# Patient Record
Sex: MALE | Race: BLACK OR AFRICAN AMERICAN | NOT HISPANIC OR LATINO | Employment: PART TIME | URBAN - METROPOLITAN AREA
[De-identification: names, ages, dates, MRNs, and addresses within clinical notes are randomized per-mention and may not be internally consistent; named-entity substitution may affect disease eponyms.]

---

## 2019-03-11 ENCOUNTER — HOSPITAL ENCOUNTER (EMERGENCY)
Facility: OTHER | Age: 49
Discharge: HOME | End: 2019-03-11
Attending: EMERGENCY MEDICINE
Payer: MEDICAID

## 2019-03-11 VITALS
HEIGHT: 71 IN | SYSTOLIC BLOOD PRESSURE: 129 MMHG | TEMPERATURE: 99 F | RESPIRATION RATE: 16 BRPM | DIASTOLIC BLOOD PRESSURE: 83 MMHG | WEIGHT: 185 LBS | HEART RATE: 102 BPM | BODY MASS INDEX: 25.9 KG/M2 | OXYGEN SATURATION: 97 %

## 2019-03-11 DIAGNOSIS — S02.601A CLOSED FRACTURE OF RIGHT SIDE OF MANDIBULAR BODY, INITIAL ENCOUNTER: Primary | ICD-10-CM

## 2019-03-11 PROCEDURE — 99284 EMERGENCY DEPT VISIT MOD MDM: CPT
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
## 2022-02-27 ENCOUNTER — HOSPITAL ENCOUNTER (EMERGENCY)
Facility: HOSPITAL | Age: 52
Discharge: HOME | End: 2022-02-27
Attending: EMERGENCY MEDICINE
Payer: MEDICAID

## 2022-02-27 VITALS
TEMPERATURE: 98 F | SYSTOLIC BLOOD PRESSURE: 147 MMHG | DIASTOLIC BLOOD PRESSURE: 60 MMHG | OXYGEN SATURATION: 96 % | RESPIRATION RATE: 18 BRPM | HEART RATE: 79 BPM

## 2022-02-27 DIAGNOSIS — Z78.9 ALCOHOL USE: Primary | ICD-10-CM

## 2022-02-27 DIAGNOSIS — E83.42 HYPOMAGNESEMIA: ICD-10-CM

## 2022-02-27 DIAGNOSIS — M79.604 PAIN IN BOTH LOWER EXTREMITIES: ICD-10-CM

## 2022-02-27 DIAGNOSIS — E87.29 HIGH ANION GAP METABOLIC ACIDOSIS: ICD-10-CM

## 2022-02-27 DIAGNOSIS — M79.605 PAIN IN BOTH LOWER EXTREMITIES: ICD-10-CM

## 2022-02-27 DIAGNOSIS — R16.0 HEPATOMEGALY: ICD-10-CM

## 2022-02-27 LAB
ALBUMIN SERPL BCP-MCNC: 4.1 G/DL (ref 3.5–5.2)
ALP SERPL-CCNC: 97 U/L (ref 55–135)
ALT SERPL W/O P-5'-P-CCNC: 73 U/L (ref 10–44)
AMPHET+METHAMPHET UR QL: NEGATIVE
ANION GAP SERPL CALC-SCNC: 18 MMOL/L (ref 8–16)
ANION GAP SERPL CALC-SCNC: 20 MMOL/L (ref 8–16)
AST SERPL-CCNC: 247 U/L (ref 10–40)
BACTERIA #/AREA URNS AUTO: ABNORMAL /HPF
BARBITURATES UR QL SCN>200 NG/ML: NEGATIVE
BASOPHILS # BLD AUTO: 0.07 K/UL (ref 0–0.2)
BASOPHILS NFR BLD: 1.1 % (ref 0–1.9)
BENZODIAZ UR QL SCN>200 NG/ML: NEGATIVE
BILIRUB SERPL-MCNC: 1.1 MG/DL (ref 0.1–1)
BILIRUB UR QL STRIP: NEGATIVE
BUN SERPL-MCNC: 19 MG/DL (ref 6–20)
BUN SERPL-MCNC: 19 MG/DL (ref 6–20)
BUN SERPL-MCNC: 22 MG/DL (ref 6–30)
BZE UR QL SCN: NEGATIVE
CALCIUM SERPL-MCNC: 9 MG/DL (ref 8.7–10.5)
CALCIUM SERPL-MCNC: 9.5 MG/DL (ref 8.7–10.5)
CANNABINOIDS UR QL SCN: NEGATIVE
CHLORIDE SERPL-SCNC: 100 MMOL/L (ref 95–110)
CHLORIDE SERPL-SCNC: 97 MMOL/L (ref 95–110)
CHLORIDE SERPL-SCNC: 98 MMOL/L (ref 95–110)
CK SERPL-CCNC: 354 U/L (ref 20–200)
CLARITY UR REFRACT.AUTO: CLEAR
CO2 SERPL-SCNC: 22 MMOL/L (ref 23–29)
CO2 SERPL-SCNC: 23 MMOL/L (ref 23–29)
COLOR UR AUTO: YELLOW
CREAT SERPL-MCNC: 1.4 MG/DL (ref 0.5–1.4)
CREAT SERPL-MCNC: 1.7 MG/DL (ref 0.5–1.4)
CREAT SERPL-MCNC: 2.2 MG/DL (ref 0.5–1.4)
CREAT UR-MCNC: 65 MG/DL (ref 23–375)
DIFFERENTIAL METHOD: ABNORMAL
EOSINOPHIL # BLD AUTO: 0 K/UL (ref 0–0.5)
EOSINOPHIL NFR BLD: 0.2 % (ref 0–8)
ERYTHROCYTE [DISTWIDTH] IN BLOOD BY AUTOMATED COUNT: 17.7 % (ref 11.5–14.5)
EST. GFR  (AFRICAN AMERICAN): 51.7 ML/MIN/1.73 M^2
EST. GFR  (AFRICAN AMERICAN): >60 ML/MIN/1.73 M^2
EST. GFR  (NON AFRICAN AMERICAN): 44.7 ML/MIN/1.73 M^2
EST. GFR  (NON AFRICAN AMERICAN): 56.6 ML/MIN/1.73 M^2
ETHANOL SERPL-MCNC: 332 MG/DL
GLUCOSE SERPL-MCNC: 73 MG/DL (ref 70–110)
GLUCOSE SERPL-MCNC: 84 MG/DL (ref 70–110)
GLUCOSE SERPL-MCNC: 88 MG/DL (ref 70–110)
GLUCOSE UR QL STRIP: NEGATIVE
HCT VFR BLD AUTO: 29.2 % (ref 40–54)
HCT VFR BLD CALC: 33 %PCV (ref 36–54)
HGB BLD-MCNC: 10 G/DL (ref 14–18)
HGB UR QL STRIP: ABNORMAL
HYALINE CASTS UR QL AUTO: 37 /LPF
IMM GRANULOCYTES # BLD AUTO: 0.02 K/UL (ref 0–0.04)
IMM GRANULOCYTES NFR BLD AUTO: 0.3 % (ref 0–0.5)
KETONES UR QL STRIP: NEGATIVE
LEUKOCYTE ESTERASE UR QL STRIP: NEGATIVE
LIPASE SERPL-CCNC: 44 U/L (ref 4–60)
LYMPHOCYTES # BLD AUTO: 1.6 K/UL (ref 1–4.8)
LYMPHOCYTES NFR BLD: 24.6 % (ref 18–48)
MAGNESIUM SERPL-MCNC: 1.2 MG/DL (ref 1.6–2.6)
MCH RBC QN AUTO: 36.9 PG (ref 27–31)
MCHC RBC AUTO-ENTMCNC: 34.2 G/DL (ref 32–36)
MCV RBC AUTO: 108 FL (ref 82–98)
METHADONE UR QL SCN>300 NG/ML: NEGATIVE
MICROSCOPIC COMMENT: ABNORMAL
MONOCYTES # BLD AUTO: 0.4 K/UL (ref 0.3–1)
MONOCYTES NFR BLD: 6.6 % (ref 4–15)
NEUTROPHILS # BLD AUTO: 4.3 K/UL (ref 1.8–7.7)
NEUTROPHILS NFR BLD: 67.2 % (ref 38–73)
NITRITE UR QL STRIP: NEGATIVE
NRBC BLD-RTO: 0 /100 WBC
OPIATES UR QL SCN: NEGATIVE
PCP UR QL SCN>25 NG/ML: NEGATIVE
PH UR STRIP: 5 [PH] (ref 5–8)
PLATELET # BLD AUTO: 320 K/UL (ref 150–450)
PMV BLD AUTO: 9.3 FL (ref 9.2–12.9)
POC IONIZED CALCIUM: 0.99 MMOL/L (ref 1.06–1.42)
POC TCO2 (MEASURED): 24 MMOL/L (ref 23–29)
POTASSIUM BLD-SCNC: 4.4 MMOL/L (ref 3.5–5.1)
POTASSIUM SERPL-SCNC: 4.5 MMOL/L (ref 3.5–5.1)
POTASSIUM SERPL-SCNC: 4.8 MMOL/L (ref 3.5–5.1)
PROT SERPL-MCNC: 9.6 G/DL (ref 6–8.4)
PROT UR QL STRIP: ABNORMAL
RBC # BLD AUTO: 2.71 M/UL (ref 4.6–6.2)
RBC #/AREA URNS AUTO: 1 /HPF (ref 0–4)
SAMPLE: ABNORMAL
SODIUM BLD-SCNC: 139 MMOL/L (ref 136–145)
SODIUM SERPL-SCNC: 138 MMOL/L (ref 136–145)
SODIUM SERPL-SCNC: 140 MMOL/L (ref 136–145)
SP GR UR STRIP: 1.02 (ref 1–1.03)
SQUAMOUS #/AREA URNS AUTO: 0 /HPF
TOXICOLOGY INFORMATION: NORMAL
URN SPEC COLLECT METH UR: ABNORMAL
WBC # BLD AUTO: 6.33 K/UL (ref 3.9–12.7)
WBC #/AREA URNS AUTO: 1 /HPF (ref 0–5)

## 2022-02-27 PROCEDURE — 82550 ASSAY OF CK (CPK): CPT | Performed by: PHYSICIAN ASSISTANT

## 2022-02-27 PROCEDURE — 83735 ASSAY OF MAGNESIUM: CPT | Performed by: PHYSICIAN ASSISTANT

## 2022-02-27 PROCEDURE — 82077 ASSAY SPEC XCP UR&BREATH IA: CPT | Performed by: PHYSICIAN ASSISTANT

## 2022-02-27 PROCEDURE — 96366 THER/PROPH/DIAG IV INF ADDON: CPT

## 2022-02-27 PROCEDURE — 80307 DRUG TEST PRSMV CHEM ANLYZR: CPT | Performed by: PHYSICIAN ASSISTANT

## 2022-02-27 PROCEDURE — 83690 ASSAY OF LIPASE: CPT | Performed by: PHYSICIAN ASSISTANT

## 2022-02-27 PROCEDURE — 80053 COMPREHEN METABOLIC PANEL: CPT | Performed by: PHYSICIAN ASSISTANT

## 2022-02-27 PROCEDURE — 85025 COMPLETE CBC W/AUTO DIFF WBC: CPT | Performed by: PHYSICIAN ASSISTANT

## 2022-02-27 PROCEDURE — 99284 PR EMERGENCY DEPT VISIT,LEVEL IV: ICD-10-PCS | Mod: ,,, | Performed by: EMERGENCY MEDICINE

## 2022-02-27 PROCEDURE — 99284 EMERGENCY DEPT VISIT MOD MDM: CPT | Mod: ,,, | Performed by: EMERGENCY MEDICINE

## 2022-02-27 PROCEDURE — 99285 EMERGENCY DEPT VISIT HI MDM: CPT | Mod: 25

## 2022-02-27 PROCEDURE — 25000003 PHARM REV CODE 250: Performed by: PHYSICIAN ASSISTANT

## 2022-02-27 PROCEDURE — 80048 BASIC METABOLIC PNL TOTAL CA: CPT | Performed by: PHYSICIAN ASSISTANT

## 2022-02-27 PROCEDURE — 80047 BASIC METABLC PNL IONIZED CA: CPT

## 2022-02-27 PROCEDURE — 63600175 PHARM REV CODE 636 W HCPCS: Performed by: PHYSICIAN ASSISTANT

## 2022-02-27 PROCEDURE — 81001 URINALYSIS AUTO W/SCOPE: CPT | Mod: 59 | Performed by: PHYSICIAN ASSISTANT

## 2022-02-27 PROCEDURE — 25500020 PHARM REV CODE 255: Performed by: EMERGENCY MEDICINE

## 2022-02-27 PROCEDURE — 96365 THER/PROPH/DIAG IV INF INIT: CPT

## 2022-02-27 PROCEDURE — 96361 HYDRATE IV INFUSION ADD-ON: CPT

## 2022-02-27 RX ORDER — LANOLIN ALCOHOL/MO/W.PET/CERES
100 CREAM (GRAM) TOPICAL DAILY
Qty: 30 TABLET | Refills: 0 | Status: SHIPPED | OUTPATIENT
Start: 2022-02-27

## 2022-02-27 RX ORDER — THIAMINE HCL 100 MG
100 TABLET ORAL DAILY
Status: DISCONTINUED | OUTPATIENT
Start: 2022-02-27 | End: 2022-02-27 | Stop reason: HOSPADM

## 2022-02-27 RX ORDER — GABAPENTIN 300 MG/1
300 CAPSULE ORAL 3 TIMES DAILY
Qty: 20 CAPSULE | Refills: 0 | Status: SHIPPED | OUTPATIENT
Start: 2022-02-27

## 2022-02-27 RX ORDER — GABAPENTIN 300 MG/1
300 CAPSULE ORAL
Status: COMPLETED | OUTPATIENT
Start: 2022-02-27 | End: 2022-02-27

## 2022-02-27 RX ORDER — MAGNESIUM SULFATE HEPTAHYDRATE 40 MG/ML
2 INJECTION, SOLUTION INTRAVENOUS
Status: COMPLETED | OUTPATIENT
Start: 2022-02-27 | End: 2022-02-27

## 2022-02-27 RX ORDER — DICYCLOMINE HYDROCHLORIDE 10 MG/1
20 CAPSULE ORAL
Status: COMPLETED | OUTPATIENT
Start: 2022-02-27 | End: 2022-02-27

## 2022-02-27 RX ORDER — HYDROCODONE BITARTRATE AND ACETAMINOPHEN 5; 325 MG/1; MG/1
1 TABLET ORAL
Status: COMPLETED | OUTPATIENT
Start: 2022-02-27 | End: 2022-02-27

## 2022-02-27 RX ADMIN — MAGNESIUM SULFATE IN WATER 2 G: 40 INJECTION, SOLUTION INTRAVENOUS at 03:02

## 2022-02-27 RX ADMIN — SODIUM CHLORIDE 1000 ML: 0.9 INJECTION, SOLUTION INTRAVENOUS at 01:02

## 2022-02-27 RX ADMIN — IOHEXOL 75 ML: 350 INJECTION, SOLUTION INTRAVENOUS at 01:02

## 2022-02-27 RX ADMIN — GABAPENTIN 300 MG: 300 CAPSULE ORAL at 01:02

## 2022-02-27 RX ADMIN — HYDROCODONE BITARTRATE AND ACETAMINOPHEN 1 TABLET: 5; 325 TABLET ORAL at 03:02

## 2022-02-27 RX ADMIN — DICYCLOMINE HYDROCHLORIDE 20 MG: 10 CAPSULE ORAL at 01:02

## 2022-02-27 RX ADMIN — Medication 100 MG: at 01:02

## 2022-02-27 NOTE — ED NOTES
I-STAT Chem-8+ Results:   Value Reference Range   Sodium 139 136-145 mmol/L   Potassium  4.4 3.5-5.1 mmol/L   Chloride 100  mmol/L   Ionized Calcium 0.99 1.06-1.42 mmol/L   CO2 (measured) 24 23-29 mmol/L   Glucose 88  mg/dL   BUN 22 6-30 mg/dL   Creatinine 2.2 0.5-1.4 mg/dL   Hematocrit 33 36-54%

## 2022-02-27 NOTE — PROVIDER PROGRESS NOTES - EMERGENCY DEPT.
Encounter Date: 2/27/2022    ED Physician Progress Notes           Patient signed out to me by my colleague with instructions to follow-up pending work-up. Please see main ED note for previous ED stay documentation.    Laboratory work showed elevated anion gap, elevated LFTs, hypomagnesia, elevated Crt 1.7, ETOH 332. CT abd/pelv showed no acute abnormalities noting hepatomegaly. Urinary bladder mild thickening, but UA without evidence of UTI. UA with +2 hematuria, CPK obtained and 334 and do not suspect rhabdomyolysis. Lipase negative. UDS negative. Repeat BMP obtained prior to IVF completion that showed improving AG and Crt improvement to 1.4. Patient able to tolerate PO. He is clinically sober on reassessment and ambulates without difficulty. Suspect overall presentation related to patient's chronic ETOH use and Thiamine deficiency. Advised outpatient follow-up with PCP for further management. RX for Thiamine and Gabapentin provided. ETOH use education provided. Patient expresses understanding and agreeable to the plan. Return to ED precautions given for new, worsening, or concerning symptoms.    ED Diagnosis:  Final diagnoses:  [M79.604, M79.605] Pain in both lower extremities  [Z72.89] Alcohol use (Primary)  [E83.42] Hypomagnesemia  [E87.2] High anion gap metabolic acidosis  [R16.0] Hepatomegaly    ED Disposition:   ED Disposition Condition    Discharge Stable        ED Medications:  Medications   sodium chloride 0.9% bolus 1,000 mL (0 mLs Intravenous Stopped 2/27/22 1432)   gabapentin capsule 300 mg (300 mg Oral Given 2/27/22 1331)   dicyclomine capsule 20 mg (20 mg Oral Given 2/27/22 1330)   iohexoL (OMNIPAQUE 350) injection 75 mL (75 mLs Intravenous Given 2/27/22 1338)   magnesium sulfate 2g in water 50mL IVPB (premix) (0 g Intravenous Stopped 2/27/22 1737)   HYDROcodone-acetaminophen 5-325 mg per tablet 1 tablet (1 tablet Oral Given 2/27/22 1540)   lactated ringers bolus 1,000 mL (0 mLs Intravenous Stopped  2/27/22 2485)

## 2022-02-27 NOTE — ED PROVIDER NOTES
"Encounter Date: 2/27/2022       History     Chief Complaint   Patient presents with    Abdominal Pain     LLQ abd pain. And bilateral lower extremity pain x 3 days     52-year-old male with reported history of heavy alcohol use, gout presents to the ED with complaints of pain.  Patient reports 3-4 days of constant, aching lower abdominal pain.  Denies fever, chills, nausea, vomiting, diarrhea.  Patient also complaining of pain from his feet that radiates up to his knees and thighs over the same duration.  Patient seen at an outside clinic where he reportedly had blood work stating that the results showed that it was his liver.  Patient states that he was told not to take Tylenol, ibuprofen for pain.  he was given a prescription medication for pain, but cannot recall the name and states that he ran out.     Patient is a very difficult historian not answering questions directly and repeatedly raising his voice stating "fix me!"  And "I'm in pain!" Security called to bedside.          Review of patient's allergies indicates:  No Known Allergies  Past Medical History:   Diagnosis Date    Gout attack      No past surgical history on file.  No family history on file.  Social History     Tobacco Use    Smoking status: Never Smoker   Substance Use Topics    Alcohol use: Yes     Review of Systems   Constitutional: Negative for fever.   HENT: Negative for sore throat.    Respiratory: Negative for shortness of breath.    Cardiovascular: Negative for chest pain.   Gastrointestinal: Positive for abdominal pain. Negative for nausea and vomiting.   Genitourinary: Negative for dysuria.   Musculoskeletal: Negative for back pain.   Skin: Negative for rash.   Neurological: Negative for weakness.   Hematological: Does not bruise/bleed easily.       Physical Exam     Initial Vitals [02/27/22 1136]   BP Pulse Resp Temp SpO2   (!) 175/111 (!) 111 20 97.9 °F (36.6 °C) 98 %      MAP       --         Physical Exam    Nursing note and " vitals reviewed.  Constitutional: He appears well-developed and well-nourished.  Non-toxic appearance. He does not appear ill. No distress.   HENT:   Head: Normocephalic and atraumatic.   Neck: Neck supple.   Normal range of motion.  Cardiovascular: Regular rhythm. Tachycardia present.  Exam reveals no gallop, no distant heart sounds and no friction rub.    No murmur heard.  Pulmonary/Chest: Effort normal and breath sounds normal. No accessory muscle usage. No tachypnea. No respiratory distress. He has no decreased breath sounds. He has no wheezes. He has no rhonchi. He has no rales.   Abdominal: Abdomen is soft. He exhibits distension. There is hepatomegaly. There is no abdominal tenderness. There is no guarding.   Musculoskeletal:      Cervical back: Normal range of motion and neck supple.      Comments: DP pulses are 2+ bilaterally.  Normal sensation.  Brisk capillary refill.  No swelling to the feet or lower legs.  No erythema or other skin color change.  No tenderness to palpation.     Neurological: He is alert.   Skin: No rash noted.   Psychiatric: He is agitated.   Pt initially raising voice frequently. Agitated. No slurred speech. Does not appear intoxicated.          ED Course   Procedures  Labs Reviewed   CBC W/ AUTO DIFFERENTIAL - Abnormal; Notable for the following components:       Result Value    RBC 2.71 (*)     Hemoglobin 10.0 (*)     Hematocrit 29.2 (*)      (*)     MCH 36.9 (*)     RDW 17.7 (*)     All other components within normal limits   COMPREHENSIVE METABOLIC PANEL - Abnormal; Notable for the following components:    Creatinine 1.7 (*)     Total Protein 9.6 (*)     Total Bilirubin 1.1 (*)      (*)     ALT 73 (*)     Anion Gap 20 (*)     eGFR if  51.7 (*)     eGFR if non  44.7 (*)     All other components within normal limits   MAGNESIUM - Abnormal; Notable for the following components:    Magnesium 1.2 (*)     All other components within normal  limits   ALCOHOL,MEDICAL (ETHANOL) - Abnormal; Notable for the following components:    Alcohol, Serum 332 (*)     All other components within normal limits   URINALYSIS, REFLEX TO URINE CULTURE - Abnormal; Notable for the following components:    Protein, UA 2+ (*)     Occult Blood UA 2+ (*)     All other components within normal limits    Narrative:     Specimen Source->Urine   URINALYSIS MICROSCOPIC - Abnormal; Notable for the following components:    Hyaline Casts, UA 37 (*)     All other components within normal limits    Narrative:     Specimen Source->Urine   BASIC METABOLIC PANEL - Abnormal; Notable for the following components:    CO2 22 (*)     Anion Gap 18 (*)     eGFR if non  56.6 (*)     All other components within normal limits    Narrative:     add on cpk per Ángel MCKNIGHT-KERRY order# [id number]  02/27/2022 @   17:43    CK - Abnormal; Notable for the following components:     (*)     All other components within normal limits    Narrative:     add on cpk per Ángel MCKNIGHT-KERRY order# [id number]  02/27/2022 @   17:43    ISTAT PROCEDURE - Abnormal; Notable for the following components:    POC Creatinine 2.2 (*)     POC Ionized Calcium 0.99 (*)     POC Hematocrit 33 (*)     All other components within normal limits   DRUG SCREEN PANEL, URINE EMERGENCY    Narrative:     Specimen Source->Urine   CK   LIPASE   LIPASE    Narrative:     ADD ON LIPASE PER JUAN LUIS MEDRANO/ORDER# [id number] @ 19:45 2/27/2022    add on cpk per Ángel MCKNIGHT-C order# [id number]  02/27/2022 @   17:43           Imaging Results          CT Abdomen Pelvis With Contrast (Final result)  Result time 02/27/22 14:10:05    Final result by Lizett Nix MD (02/27/22 14:10:05)                 Impression:      No acute abnormality.    Hepatomegaly and hepatic steatosis.    Circumferential mild bladder wall thickening, could be from nondistention or infectious/inflammatory process.  Correlation with urinalysis is  recommended.    Additional findings above.    Electronically signed by resident: Carmen James  Date:    02/27/2022  Time:    13:46    Electronically signed by: Lizett Nix MD  Date:    02/27/2022  Time:    14:10             Narrative:    EXAMINATION:  CT ABDOMEN PELVIS WITH CONTRAST    CLINICAL HISTORY:  Abdominal abscess/infection suspected;    TECHNIQUE:  Axial images of the abdomen and pelvis were acquired  after the use of 75 cc [id number] IV contrast.  Coronal and sagittal reconstructions were also obtained    COMPARISON:  None    FINDINGS:  Heart: Normal in size. No pericardial effusion.    Lungs: Visualized portions of the lungs are clear.    Liver: Enlarged measuring 19.4 cm.  Diffuse hepatic hypoattenuation suggestive of steatosis.  No focal hepatic lesion.  No ascites.    Gallbladder: No radiopaque stones seen.    Bile Ducts: No evidence of dilated ducts.    Pancreas: No mass or peripancreatic fat stranding.    Spleen: Unremarkable.    Stomach and duodenum: Unremarkable.    Adrenals: Unremarkable.    Kidneys/ Ureters: Normal size with abnormally rotated right kidney.  Normal enhancement. No hydronephrosis or nephrolithiasis. No ureteral dilatation.    Bladder: Circumferential wall thickening, could be from nondistention or infection/inflammation.    Reproductive organs: Unremarkable.    Bowel/Mesentery: Small bowel is normal in caliber with no evidence of obstruction. No evidence of inflammation or wall thickening.  Colon demonstrates no focal wall thickening.  Appendix is unremarkable.    Peritoneum: No intraperitoneal free air or fluid    Lymph nodes: No retroperitoneal lymphadenopathy.    Vasculature: No aneurysm. No significant calcific atherosclerosis.    Abdominal wall:  Unremarkable.    Bones: Minimal anterolisthesis of L4 on L5.  No acute fracture. No suspicious osseous lesions.                                 Medications   sodium chloride 0.9% bolus 1,000 mL (0 mLs Intravenous Stopped  2/27/22 1432)   gabapentin capsule 300 mg (300 mg Oral Given 2/27/22 1331)   dicyclomine capsule 20 mg (20 mg Oral Given 2/27/22 1330)   iohexoL (OMNIPAQUE 350) injection 75 mL (75 mLs Intravenous Given 2/27/22 1338)   magnesium sulfate 2g in water 50mL IVPB (premix) (0 g Intravenous Stopped 2/27/22 1737)   HYDROcodone-acetaminophen 5-325 mg per tablet 1 tablet (1 tablet Oral Given 2/27/22 1540)   lactated ringers bolus 1,000 mL (0 mLs Intravenous Stopped 2/27/22 1815)     Medical Decision Making:   History:   Old Medical Records: I decided to obtain old medical records.  Initial Assessment:   51 yo M with chronic EtOH use presents with abdominal and LE pain for the past 3-4 days.  Tachycardic on initial evaluation.  Vitals otherwise within normal limits.  Afebrile.  See additional exam findings above.  Differential Diagnosis:   My differential diagnosis includes but is not limited to:  Liver failure, PUD, UTI, bowel obstruction, chronic alcohol abuse, constipation, neuropathy, vascular insufficiency, vitamin insufficiency.    Clinical Tests:   Lab Tests: Ordered  Radiological Study: Ordered  ED Management:  Patient received IV fluids, magnesium and thiamine supplementation.  Liver enzymes are elevated.  Anion gap is elevated to 20. CT a/P showed bladder wall thickening without other acute abdominal pelvic pathology.  UA without infection.  Unclear etiology of patient's abdominal pain.  Suspect neuropathic pain as etiology of patient's lower extremity pain.  Will plan to treat with gabapentin, thiamine supplementation.  He is established with a PCP.   Pt signed out to PA colleague pending repeat BMP, administration of supplements and additional fluids; and final dispo.     I have reviewed the patient's records and discussed this case with my supervising physician.                Attending Attestation:     Physician Attestation Statement for NP/PA:   I have conducted a face to face encounter with this patient in  addition to the NP/PA, due to Medical Complexity    Other NP/PA Attestation Additions:    History of Present Illness: This is a 52-year-old male who presents to the emergency department today stating that he has significant pain in his legs and in his lower abdomen.  He states that he has had this intermittently for quite some time but that is worse today.  He also states that he has been drinking and does initially appear intoxicated, raising his voice frequently on his phone and demanding help.  He states that the pain in his legs does not worsen when he exerts himself.  He states that actually feels better once he is up on his feet for a while.  He has not had any vomiting or diarrhea.  He also states that he has already been seen for this previously and was told that he has some abnormalities to his liver.  He has not had any chest pain or shortness of breath.  He has not had any fevers or chills.  He has not had any back pain.   Physical Exam: Initial vital signs include a blood pressure of 175/111 and a heart rate of 111.  He was not hypoxic or febrile.  Consititutional: Pt was initially quite loud, demanding help, and speaking loudly on his phone.  He appeared obviously intoxicated.  HEENT: PERRL; nares patent; op clear; mmm without lesions.  Neck: Supple with good ROM.  CV:  Tachycardic rate; regular rhythm; no mrg. Heart sounds normal. No peripheral edema. 2+ radials bilateral and symmetric.  Respiratory: CTA bilaterally with no focal rales, ronchi, or wheezes.  GI: Abdomen soft, the patient has diffuse tenderness to palpation, ND. No rebound. No guarding. BS normal.  MSK: No long bone deformities; no focal joint swellings.  In regards to his bilateral lower extremities, the patient has warm and well-perfused bilateral lower extremities with 2+ dorsalis pedis pulses bilaterally and symmetric.  He has full range of motion at all the joints of his lower extremities actively without any worsening discomfort.   He has no temperature differential between his limbs.  He has no swelling to his limbs.  He has no Homans or cords.  He has 5/5 motor strength throughout his lower extremities both proximal distal and symmetric.  He also has sensation to fine touch grossly intact throughout them.  Neuro:  The patient does have altered mental status initially as above.  MAEW.  See above.  Skin: No skin lesions or rash.    Medical Decision Making: The patient was initially intoxicated.  He was complaining of abdominal pain and lower extremity pain bilaterally.  His exam revealed some diffuse mild tenderness throughout his abdomen but his lower extremity exam appeared normal.  We initiated his workup by placing an IV and obtaining laboratory studies and CT imaging, administering some intravenous fluids, and giving him some time to sober up so that we could repeat his examination in talking greater detail.  Once he became clinically sober I went in and discussed the case with him again.  His repeat abdominal exam was benign.  We already had some laboratory studies back including a normal white count of 6.33, a hematocrit of 29.2, and initial BMP that revealed a bicarb of 23 with an anion gap of 20 and a creatinine of 1.7.  His initial blood alcohol level was 332. He does have a transaminitis with an AST of 247 and an ALT of 73 with a total bilirubin of 1.1.  Urinalysis is still pending.  CT imaging of the abdomen and pelvis with contrast, independently reviewed and interpreted by me and interpreted by Radiology, reveals hepatic steatosis and evidence of some bladder wall thickening for which we need correlation with urinalysis.  At this point we also need to assure that following hydration we repeat his BMP, that his anion gap closes, and that hopefully his creatinine improves.  I will sign out the care of the patient to the oncoming ED physician to follow up on those results.  In regards to the lower extremity complaints, I do not feel  that the patient has a DVT.  I also do not feel that he has any concern for limb ischemia.  He is in stable condition at this time.  ED diagnosis:  1. Acute abdominal pain.  2. Acute bilateral lower extremity pain.  3. Acute wide gap metabolic acidosis.  4. Acute renal insufficiency.  5. Acute dehydration.  6. Acute alcohol intoxication, resolved.  7. Transaminitis.               ED Course as of 02/28/22 1012   Sun Feb 27, 2022   1710 Alcohol, Serum(!!): 332 [EH]   1710 Magnesium(!): 1.2 [EH]   1710 Creatinine(!): 1.7 [EH]   1710 Baseline Cr around 1.2 (outside labs 10/2021) [EH]      ED Course User Index  [EH] Caitlin Rosario PA-C             Clinical Impression:   Final diagnoses:  [M79.604, M79.605] Pain in both lower extremities  [Z72.89] Alcohol use (Primary)  [E83.42] Hypomagnesemia  [E87.2] High anion gap metabolic acidosis  [R16.0] Hepatomegaly          ED Disposition Condition    Discharge Stable        ED Prescriptions     Medication Sig Dispense Start Date End Date Auth. Provider    gabapentin (NEURONTIN) 300 MG capsule Take 1 capsule (300 mg total) by mouth 3 (three) times daily. 20 capsule 2/27/2022  Caitlin Rosario PA-C    thiamine 100 MG tablet Take 1 tablet (100 mg total) by mouth once daily. 30 tablet 2/27/2022  Caitlin Rosario PA-C        Follow-up Information     Follow up With Specialties Details Why Contact Info Additional Information    [facility]   [address]  [facility] [id number] [phone] [facility] Please park in surface lot and check in at central registration desk           Caitlin Rosario PA-C  02/28/22 1012       Maribeth Rios MD  02/28/22 0475

## 2022-02-27 NOTE — ED NOTES
Pt returned from CT. Aware or urine sample needing to be collected. Pt verbalized understanding. Provided w/ urine cup.

## 2022-02-27 NOTE — ED NOTES
"Pt states "stomach is numb" x 4 days. Denies N/V/D. Decreased appetite, has been eating fruit and salad. DEZ pain in legs from knees to feet. Hx of gout but states this is not gout.   Patient identifiers for Joel Grant 52 y.o. male checked and correct.  Chief Complaint   Patient presents with    Abdominal Pain     LLQ abd pain. And bilateral lower extremity pain x 3 days     Past Medical History:   Diagnosis Date    Gout attack      Allergies reported: Review of patient's allergies indicates:  No Known Allergies      LOC: Patient is awake, alert, and aware of environment with an appropriate affect. Patient is oriented x 4 and speaking appropriately.  APPEARANCE: Patient resting comfortably and in no acute distress. Patient is clean and well groomed, patient's clothing is properly fastened.  HEENT: mm p/m/i  SKIN: The skin is warm and dry. Patient has normal skin turgor and moist mucus membranes.   MUSKULOSKELETAL: Patient is moving all extremities well, no obvious deformities noted. Pulses intact.   RESPIRATORY: Airway is open and patent. Respirations are spontaneous and non-labored with normal effort and rate.  CARDIAC: Patient has a normal rate and rhythm.  ABDOMEN: No distention noted. Soft and non-tender upon palpation.  NEUROLOGICAL: pupils 3mm, PERRL. Facial expression is symmetrical. Hand grasps are equal bilaterally. Normal sensation in all extremities when touched with finger.        "

## 2022-02-28 NOTE — DISCHARGE INSTRUCTIONS
Take the prescribed Thiamine and Gabapentin as directed for further management of your extremity pain.     Continue hydrating by drinking plenty of water at home.     Follow-up with your primary care provider within the next week.     Return to the emergency room for new, worsening, or concerning symptoms.

## 2022-08-10 ENCOUNTER — HOSPITAL ENCOUNTER (OUTPATIENT)
Dept: RADIOLOGY | Facility: OTHER | Age: 52
Discharge: HOME | End: 2022-08-10
Attending: NURSE PRACTITIONER
Payer: MEDICAID

## 2022-08-10 DIAGNOSIS — R74.01 ELEVATED LIVER TRANSAMINASE LEVEL: ICD-10-CM

## 2023-10-16 ENCOUNTER — HOSPITAL ENCOUNTER (EMERGENCY)
Facility: OTHER | Age: 53
Discharge: HOME | End: 2023-10-16
Attending: EMERGENCY MEDICINE
Payer: MEDICAID

## 2023-10-16 VITALS
BODY MASS INDEX: 25.18 KG/M2 | DIASTOLIC BLOOD PRESSURE: 78 MMHG | HEART RATE: 87 BPM | WEIGHT: 170 LBS | HEIGHT: 69 IN | RESPIRATION RATE: 19 BRPM | SYSTOLIC BLOOD PRESSURE: 129 MMHG | TEMPERATURE: 99 F | OXYGEN SATURATION: 99 %
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
## 2023-10-16 DIAGNOSIS — S52.502A CLOSED FRACTURE OF DISTAL END OF LEFT RADIUS, UNSPECIFIED FRACTURE MORPHOLOGY, INITIAL ENCOUNTER: Primary | ICD-10-CM

## 2023-10-16 PROCEDURE — 25000003 PHARM REV CODE 250: Performed by: NURSE PRACTITIONER

## 2023-10-16 PROCEDURE — 99283 EMERGENCY DEPT VISIT LOW MDM: CPT

## 2023-10-16 PROCEDURE — 25000003 PHARM REV CODE 250: Performed by: PHYSICIAN ASSISTANT

## 2023-10-16 PROCEDURE — 29105 APPLICATION LONG ARM SPLINT: CPT | Mod: LT

## 2023-10-16 RX ORDER — OXYCODONE HYDROCHLORIDE 5 MG/1
5 TABLET ORAL EVERY 4 HOURS PRN
Qty: 10 TABLET | Refills: 0 | Status: SHIPPED | OUTPATIENT
Start: 2023-10-16

## 2023-10-16 RX ORDER — ACETAMINOPHEN 325 MG/1
650 TABLET ORAL
Status: COMPLETED | OUTPATIENT
Start: 2023-10-16 | End: 2023-10-16

## 2023-10-16 RX ORDER — OXYCODONE HYDROCHLORIDE 5 MG/1
5 TABLET ORAL
Status: COMPLETED | OUTPATIENT
Start: 2023-10-16 | End: 2023-10-16

## 2023-10-16 RX ADMIN — OXYCODONE HYDROCHLORIDE 5 MG: 5 TABLET ORAL at 06:10

## 2023-10-16 RX ADMIN — ACETAMINOPHEN 650 MG: 325 TABLET, FILM COATED ORAL at 05:10
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
## 2023-10-16 NOTE — FIRST PROVIDER EVALUATION
Emergency Department TeleTriage Encounter Note      CHIEF COMPLAINT    Chief Complaint   Patient presents with    Hand Pain     Traumatic injury the L hand after falling two days ago.  Tender to touch & swelling noted to the left hand into the left wrist.       VITAL SIGNS   Initial Vitals [10/16/23 1713]   BP Pulse Resp Temp SpO2   127/72 90 17 -- 98 %      MAP       --            ALLERGIES    Review of patient's allergies indicates:  No Known Allergies    PROVIDER TRIAGE NOTE  This is a teletriage evaluation of a 53 y.o. male presenting to the ED complaining of left wrist and hand pain after fall two days ago.     Initial orders will be placed and care will be transferred to an alternate provider when patient is roomed for a full evaluation. Any additional orders and the final disposition will be determined by that provider.         ORDERS  Labs Reviewed - No data to display    ED Orders (720h ago, onward)      Start Ordered     Status Ordering Provider    10/16/23 1729 10/16/23 1729  X-Ray Hand 3 View Left  1 time imaging         Ordered NAWAF MOTLEY    10/16/23 1729 10/16/23 1729  X-Ray Wrist Complete Left  1 time imaging         Ordered NAWAF MOTLEY.    10/16/23 1729 10/16/23 1729  Ice to affected area  Once         Ordered NAWAF MOTLEY              Virtual Visit Note: The provider triage portion of this emergency department evaluation and documentation was performed via [employer], a HIPAA-compliant telemedicine application, in concert with a tele-presenter in the room. A face to face patient evaluation with one of my colleagues will occur once the patient is placed in an emergency department room.      DISCLAIMER: This note was prepared with [employer] voice recognition transcription software. Garbled syntax, mangled pronouns, and other bizarre constructions may be attributed to that software system.
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
## 2023-11-14 ENCOUNTER — HOSPITAL ENCOUNTER (EMERGENCY)
Facility: OTHER | Age: 53
Discharge: HOME | End: 2023-11-14
Attending: EMERGENCY MEDICINE
Payer: MEDICAID

## 2023-11-14 VITALS
OXYGEN SATURATION: 97 % | DIASTOLIC BLOOD PRESSURE: 74 MMHG | RESPIRATION RATE: 17 BRPM | BODY MASS INDEX: 27.32 KG/M2 | HEART RATE: 89 BPM | SYSTOLIC BLOOD PRESSURE: 125 MMHG | WEIGHT: 185 LBS | TEMPERATURE: 98 F

## 2023-11-14 DIAGNOSIS — Z59.00 HOMELESSNESS: ICD-10-CM

## 2023-11-14 DIAGNOSIS — Z11.1 SCREENING EXAMINATION FOR PULMONARY TUBERCULOSIS: Primary | ICD-10-CM

## 2023-11-14 LAB
CTP QC/QA: YES
SARS-COV-2 RDRP RESP QL NAA+PROBE: NEGATIVE

## 2023-11-14 PROCEDURE — 87635 SARS-COV-2 COVID-19 AMP PRB: CPT | Performed by: EMERGENCY MEDICINE

## 2023-11-14 PROCEDURE — 99283 EMERGENCY DEPT VISIT LOW MDM: CPT | Mod: 25

## 2023-11-14 SDOH — SOCIAL DETERMINANTS OF HEALTH (SDOH): HOMELESSNESS UNSPECIFIED: Z59.00

## 2023-11-14 NOTE — DISCHARGE INSTRUCTIONS
Your COVID test today was negative.    Your chest x-ray did not show signs of active tuberculosis.

## 2023-11-14 NOTE — ED PROVIDER NOTES
"Encounter Date: 11/14/2023    SCRIBE #1 NOTE: I, Martin Seth, am scribing for, and in the presence of,  Romeo Licona II, MD. I have scribed the following portions of the note - Other sections scribed: HPI, ROS, PE.       History     Chief Complaint   Patient presents with    [facility]     Needing covid & TB     Time seen by provider: 5:12 PM    This is a 53 y.o. male with a PMHx of anemia who presents with a request for shelter clearance. He is requesting a Covid test and a tuberculosis test so he may gain access to the shelter. He denies any fever or current cough. He denies any known sick contact. He notes chronic chills from his anemia. Notably, he reports a history of hemoptysis and epistaxis that began 1 year ago but have not been present in recent history. He is currently taking multiple prescription medications, including triperidol and gabapentin.    The patient also notes that his left arm is broken. He was seen here about one month ago and was seen by this provider. He was given a cast but has since removed it due to the cast being "too bulky".    Patient denies any other complaints at this time.    The history is provided by the patient.     Review of patient's allergies indicates:  No Known Allergies  Past Medical History:   Diagnosis Date    Gout attack     Unspecified cirrhosis of liver      History reviewed. No pertinent surgical history.  History reviewed. No pertinent family history.  Social History     Tobacco Use    Smoking status: Never   Substance Use Topics    Alcohol use: Yes     Review of Systems  SEE HPI.  Physical Exam     Initial Vitals [11/14/23 1637]   BP Pulse Resp Temp SpO2   125/74 89 17 98.4 °F (36.9 °C) 97 %      MAP       --         Physical Exam    Nursing note and vitals reviewed.  Constitutional: He appears well-developed and well-nourished. He is not diaphoretic. No distress.   HENT:   Head: Normocephalic and atraumatic.   Right Ear: External ear normal.   Left Ear: " External ear normal.   Nose: Nose normal.   Mouth/Throat: Oropharynx is clear and moist.   Moist mucous membranes.    Eyes: Conjunctivae and EOM are normal. Pupils are equal, round, and reactive to light. No scleral icterus.   No pallor or icterus.   Neck: Neck supple. No JVD present.   Normal range of motion.  Cardiovascular:  Normal rate, regular rhythm, normal heart sounds and intact distal pulses.     Exam reveals no friction rub.       No murmur heard.  Pulmonary/Chest: Breath sounds normal. No respiratory distress. He has no wheezes. He has no rhonchi. He has no rales.   Abdominal: Abdomen is soft. Bowel sounds are normal. He exhibits no distension and no mass. There is no abdominal tenderness. There is no rebound and no guarding.   Musculoskeletal:         General: No edema. Normal range of motion.      Cervical back: Normal range of motion and neck supple.     Lymphadenopathy:     He has no cervical adenopathy.   Neurological: He is alert and oriented to person, place, and time. He has normal strength. No cranial nerve deficit or sensory deficit.   Skin: Skin is warm and dry. No rash noted.   Psychiatric: He has a normal mood and affect. His behavior is normal. Thought content normal.         ED Course   Procedures  Labs Reviewed   SARS-COV-2 RDRP GENE          Imaging Results              X-Ray Chest PA And Lateral (Final result)  Result time 11/14/23 17:44:26      Final result by Lety Wu MD (11/14/23 17:44:26)                   Impression:      No acute cardiopulmonary process identified.      Electronically signed by: Lety Wu MD  Date:    11/14/2023  Time:    17:44               Narrative:    EXAMINATION:  XR CHEST PA AND LATERAL    CLINICAL HISTORY:  screening;    TECHNIQUE:  PA and lateral views of the chest were performed.    COMPARISON:  None    FINDINGS:  Cardiac silhouette is normal in size.  Lungs are symmetrically expanded.  No evidence of focal consolidative process,  pneumothorax, or significant pleural effusion.  No acute osseous abnormality identified.                                    X-Rays:   Independently Interpreted Readings:   Chest X-Ray: No focal infiltrate or effusions. No cavitary lesions.     Medications - No data to display  Medical Decision Making  Amount and/or Complexity of Data Reviewed  External Data Reviewed: notes.  Radiology: ordered.    Patient presents requesting TB screening and COVID testing for admittance to nearby shelter.  Reported hemoptysis a year ago but none recently.  Denies exposure to TB.  No acute symptoms.  COVID negative, chest x-ray without signs of active TB.  Patient given documentation stating such.  Stable for discharge        Scribe Attestation:   Scribe #1: I performed the above scribed service and the documentation accurately describes the services I performed. I attest to the accuracy of the note.         Physician Attestation for Scribe: I, [facility], reviewed documentation as scribed in my presence, which is both accurate and complete.                Clinical Impression:   Final diagnoses:  [Z11.1] Screening examination for pulmonary tuberculosis (Primary)  [Z59.00] Homelessness        ED Disposition Condition    Discharge Stable          ED Prescriptions    None       Follow-up Information       Follow up With Specialties Details Why Contact Info    Primary Care Clinic  Schedule an appointment as soon as possible for a visit  As needed              Romeo Licona II, MD  11/14/23 5941

## 2023-11-14 NOTE — ED TRIAGE NOTES
Pt reports to ED for chest x-ray and COVID test for shelter clearance. Pt reports he has been diagnosed with liver cirrhosis and reports lower back pain and bilateral knee pain. Respirations even and unlabored. Aaox4.

## 2023-11-15 ENCOUNTER — HOSPITAL ENCOUNTER (EMERGENCY)
Facility: OTHER | Age: 53
Discharge: HOME | End: 2023-11-15
Attending: EMERGENCY MEDICINE
Payer: MEDICAID

## 2023-11-15 VITALS
DIASTOLIC BLOOD PRESSURE: 78 MMHG | TEMPERATURE: 98 F | HEART RATE: 84 BPM | SYSTOLIC BLOOD PRESSURE: 132 MMHG | OXYGEN SATURATION: 98 % | RESPIRATION RATE: 16 BRPM | BODY MASS INDEX: 27.4 KG/M2 | HEIGHT: 69 IN | WEIGHT: 185 LBS

## 2023-11-15 DIAGNOSIS — S52.502D CLOSED FRACTURE OF DISTAL END OF LEFT RADIUS WITH ROUTINE HEALING, UNSPECIFIED FRACTURE MORPHOLOGY, SUBSEQUENT ENCOUNTER: Primary | ICD-10-CM

## 2023-11-15 PROCEDURE — 29125 APPL SHORT ARM SPLINT STATIC: CPT | Mod: LT

## 2023-11-15 PROCEDURE — 99283 EMERGENCY DEPT VISIT LOW MDM: CPT

## 2023-11-15 PROCEDURE — 25000003 PHARM REV CODE 250: Performed by: EMERGENCY MEDICINE

## 2023-11-15 RX ORDER — ACETAMINOPHEN 325 MG/1
650 TABLET ORAL
Status: COMPLETED | OUTPATIENT
Start: 2023-11-15 | End: 2023-11-15

## 2023-11-15 RX ADMIN — ACETAMINOPHEN 650 MG: 325 TABLET, FILM COATED ORAL at 04:11

## 2023-11-15 NOTE — FIRST PROVIDER EVALUATION
" Emergency Department TeleTriage Encounter Note      CHIEF COMPLAINT    Chief Complaint   Patient presents with    Wrist Pain     PT WAS DX WITH A BROKEN l WRIST 2 WEEKS AGO. SPLINT PLACED, PT TOOK IT OFF 1 WEEK AGO BC "IT WAS DRIVING ME CRAZY" AND HE COULDN'T PUT HIS CLOTHES OVER IT. PRESENTS TODAY WITH L WRIST PAIN. PT STATES "BUT YALL BETTER NOT PUT ANOTHER ONE OF THOSE ON ME". DENIES OTHER SYMPTOMS.        VITAL SIGNS   Initial Vitals [11/15/23 1254]   BP Pulse Resp Temp SpO2   138/87 89 18 98.1 °F (36.7 °C) 100 %      MAP       --            ALLERGIES    Review of patient's allergies indicates:  No Known Allergies    PROVIDER TRIAGE NOTE  This is a teletriage evaluation of a 53 y.o. male presenting to the ED complaining of wrist pain. Patient has known fracture to left wrist. He took splint off one week ago. He is complaining of worsening pain today.    Patient is alert and oriented. He speaks in complete sentences. He is sitting upright in the chair in no distress.     Initial orders will be placed and care will be transferred to an alternate provider when patient is roomed for a full evaluation. Any additional orders and the final disposition will be determined by that provider.         ORDERS  Labs Reviewed - No data to display    ED Orders (720h ago, onward)      Start Ordered     Status Ordering Provider    11/15/23 1351 11/15/23 1350  X-Ray Wrist Complete Left  1 time imaging         Ordered NADIR CHINCHILLA              Virtual Visit Note: The provider triage portion of this emergency department evaluation and documentation was performed via [employer], a HIPAA-compliant telemedicine application, in concert with a tele-presenter in the room. A face to face patient evaluation with one of my colleagues will occur once the patient is placed in an emergency department room.      DISCLAIMER: This note was prepared with [employer] voice recognition transcription software. Garbled syntax, mangled pronouns, and " other bizarre constructions may be attributed to that software system.

## 2023-11-15 NOTE — ED TRIAGE NOTES
Patient states that he broke his wrist 1 week ago. Splint was applied but it was too bulky to allow him to put his clothes on. He took the splint off and now has increased pain. Patient verbalizes understanding that splint immobilizes his arm and allows bone to heal which also reduces pain. Willing to allow ED to put the splint back on. Requesting referral to ortho for appropriate follow-up.

## 2023-11-15 NOTE — ED PROVIDER NOTES
"Encounter Date: 11/15/2023    SCRIBE #1 NOTE: I, Maritza Radha, am scribing for, and in the presence of,  Lj Earl MD. I have scribed the following portions of the note - Other sections scribed: HPI, ROS, PE.       History     Chief Complaint   Patient presents with    Wrist Pain     PT WAS DX WITH A BROKEN l WRIST 2 WEEKS AGO. SPLINT PLACED, PT TOOK IT OFF 1 WEEK AGO BC "IT WAS DRIVING ME CRAZY" AND HE COULDN'T PUT HIS CLOTHES OVER IT. PRESENTS TODAY WITH L WRIST PAIN. PT STATES "BUT YALL BETTER NOT PUT ANOTHER ONE OF THOSE ON ME". DENIES OTHER SYMPTOMS.      Time seen by provider: 3:50 PM    This is a 53 y.o. male who presents with complaint of left wrist pain.The Pt was seen 1 month ago for the same symptoms and was told he had fractured his wrist, was placed in a splint. He was also seen here yesterday for TB/COVID screening for shelter placement.Today he reports that he took his splint off a week ago because it was bothering him. He reports that he has not taken anything for the pain. He states that he has a PMHx of cirrhosis from alcohol abuse. This is the extent of the patient's complaints at this time.       The history is provided by the patient.     Review of patient's allergies indicates:  No Known Allergies  Past Medical History:   Diagnosis Date    Gout attack     Unspecified cirrhosis of liver      No past surgical history on file.  No family history on file.  Social History     Tobacco Use    Smoking status: Never   Substance Use Topics    Alcohol use: Yes     Review of Systems   Constitutional:  Negative for fever.   HENT:  Negative for congestion.    Eyes:  Negative for redness.   Respiratory:  Negative for shortness of breath.    Cardiovascular:  Negative for chest pain.   Gastrointestinal:  Negative for abdominal pain.   Genitourinary:  Negative for dysuria.   Musculoskeletal:  Positive for arthralgias.   Skin:  Negative for rash.   Neurological:  Negative for headaches. "   Psychiatric/Behavioral:  Negative for confusion.        Physical Exam     Initial Vitals [11/15/23 1254]   BP Pulse Resp Temp SpO2   138/87 89 18 98.1 °F (36.7 °C) 100 %      MAP       --         Physical Exam    Nursing note and vitals reviewed.  Constitutional: He appears well-developed and well-nourished. He is not diaphoretic. No distress.   HENT:   Head: Normocephalic and atraumatic.   Eyes: Conjunctivae are normal.   Neck: Neck supple.   Cardiovascular:  Normal rate, regular rhythm, normal heart sounds and intact distal pulses.           No murmur heard.  Pulmonary/Chest: Breath sounds normal. No respiratory distress. He has no wheezes. He has no rhonchi. He has no rales.   Musculoskeletal:      Left wrist: Normal range of motion.      Cervical back: Neck supple.      Comments: Mild left distal soft tissue edema and tenderness of distal L wrist. Full range of motion.      Neurological: He is alert and oriented to person, place, and time.   Skin: Skin is warm and dry.   Psychiatric: He has a normal mood and affect.         ED Course   Procedures  Labs Reviewed - No data to display       Imaging Results              X-Ray Wrist Complete Left (Final result)  Result time 11/15/23 14:29:29      Final result by Swathi Perry MD (11/15/23 14:29:29)                   Impression:      Please see above.      Electronically signed by: Swathi Perry  Date:    11/15/2023  Time:    14:29               Narrative:    EXAMINATION:  XR WRIST COMPLETE 3 VIEWS LEFT    CLINICAL HISTORY:  Unspecified injury of unspecified wrist, hand and finger(s), initial encounter    TECHNIQUE:  PA, lateral, and oblique views of the left wrist were performed.    COMPARISON:  10/16/2023    FINDINGS:  Intra-articular fracture through the distal radius remains nondisplaced.  Fracture line remains visible perhaps with early callus formation which can be seen with healing.    No new fracture.    Persistent diffuse soft tissue edema.                                        Medications   acetaminophen tablet 650 mg (650 mg Oral Given 11/15/23 5588)     Medical Decision Making      53-year-old male with history of cirrhosis presents for evaluation of persistent left wrist pain.  He states he had a fall recently and was placed in a splint for a wrist fracture, but took it off about a week ago because it was too uncomfortable and not functional.  He has had persistent wrist pain and swelling since then, not taking any pain medications for it.  Of note, patient was seen here yesterday for COVID/TB screening prior to shelter placement, no other new complaints.   On arrival patient well-appearing in no distress, with minimal left soft tissue edema over his left distal wrist, with ROM intact and distal neurovascularly intact.  Per chart review he sustained a displaced comminuted intra-articular distal radius fracture 1 month ago, has not followed up with Orthopedics since then.    X-ray repeated today that shows nondisplaced fracture, and some callus formation as well.  No indication for any further reduction attempts, and patient will not tolerate another sugar-tong splint, so will place in a removable wrist splint until he can follow up with Orthopedics.  He is also advised on further supportive care, states that he was told he can not have NSAIDs in the past.  Per chart review he does have alcoholic cirrhosis but also history of upper GI bleed and mild CKD which is contraindication to NSAIDs, so he can only take Tylenol sparingly, no indication for opiates at this time.  Patient comfortable with discharge plan and understands need for Orthopedics follow-up and return precautions.        Amount and/or Complexity of Data Reviewed  External Data Reviewed: notes.  Radiology: ordered.    Risk  OTC drugs.            Scribe Attestation:   Scribe #1: I performed the above scribed service and the documentation accurately describes the services I performed. I attest to  the accuracy of the note.              I, Dr. Lj Earl, personally performed the services described in this documentation. All medical record entries made by the scribe were at my direction and in my presence.  I have reviewed the chart and agree that the record reflects my personal performance and is accurate and complete. Lj Earl MD.             Clinical Impression:   Final diagnoses:  [A03.144M] Closed fracture of distal end of left radius with routine healing, unspecified fracture morphology, subsequent encounter (Primary)        ED Disposition Condition    Discharge Stable          ED Prescriptions    None       Follow-up Information       Follow up With Specialties Details Why Contact [facility] Oncology, Orthopedic Surgery, Genetics, Physical Medicine and Rehabilitation, Occupational Therapy, Radiology Schedule an appointment as soon as possible for a visit in 1 week Orthopedics [address] [place] [address]  [phone]               Lj Earl MD  11/15/23 0461

## 2023-11-22 DIAGNOSIS — S52.502A CLOSED FRACTURE OF DISTAL END OF LEFT RADIUS, UNSPECIFIED FRACTURE MORPHOLOGY, INITIAL ENCOUNTER: Primary | ICD-10-CM

## 2024-06-06 ENCOUNTER — ANESTHESIA EVENT (OUTPATIENT)
Dept: ENDOSCOPY | Facility: OTHER | Age: 54
End: 2024-06-06
Payer: MEDICAID

## 2024-06-06 ENCOUNTER — HOSPITAL ENCOUNTER (OUTPATIENT)
Facility: OTHER | Age: 54
Discharge: HOME | End: 2024-06-07
Attending: INTERNAL MEDICINE | Admitting: HOSPITALIST
Payer: MEDICAID

## 2024-06-06 DIAGNOSIS — K92.2 GASTROINTESTINAL HEMORRHAGE, UNSPECIFIED GASTROINTESTINAL HEMORRHAGE TYPE: Primary | ICD-10-CM

## 2024-06-06 DIAGNOSIS — Z91.148 H/O MEDICATION NONCOMPLIANCE: ICD-10-CM

## 2024-06-06 DIAGNOSIS — D64.9 ANEMIA, UNSPECIFIED TYPE: ICD-10-CM

## 2024-06-06 DIAGNOSIS — K92.0 HEMATEMESIS: ICD-10-CM

## 2024-06-06 DIAGNOSIS — E16.2 HYPOGLYCEMIA: ICD-10-CM

## 2024-06-06 DIAGNOSIS — K92.2 UPPER GI BLEEDING: ICD-10-CM

## 2024-06-06 DIAGNOSIS — I85.11 SECONDARY ESOPHAGEAL VARICES WITH BLEEDING: ICD-10-CM

## 2024-06-06 PROBLEM — K70.30 ALCOHOLIC CIRRHOSIS: Status: ACTIVE | Noted: 2024-06-06

## 2024-06-06 PROBLEM — F10.10 ALCOHOL ABUSE: Status: ACTIVE | Noted: 2024-06-06

## 2024-06-06 PROBLEM — D62 ACUTE BLOOD LOSS ANEMIA: Status: ACTIVE | Noted: 2024-06-06

## 2024-06-06 PROBLEM — F14.10 COCAINE ABUSE: Status: ACTIVE | Noted: 2024-06-06

## 2024-06-06 LAB
ABO + RH BLD: NORMAL
ALBUMIN SERPL BCP-MCNC: 3.2 G/DL (ref 3.5–5.2)
ALP SERPL-CCNC: 117 U/L (ref 55–135)
ALT SERPL W/O P-5'-P-CCNC: 46 U/L (ref 10–44)
AMPHET+METHAMPHET UR QL: NEGATIVE
ANION GAP SERPL CALC-SCNC: 18 MMOL/L (ref 8–16)
AST SERPL-CCNC: 167 U/L (ref 10–40)
B-OH-BUTYR BLD STRIP-SCNC: 0.2 MMOL/L (ref 0–0.5)
BACTERIA #/AREA URNS HPF: NORMAL /HPF
BARBITURATES UR QL SCN>200 NG/ML: NEGATIVE
BASOPHILS # BLD AUTO: 0.02 K/UL (ref 0–0.2)
BASOPHILS # BLD AUTO: 0.02 K/UL (ref 0–0.2)
BASOPHILS # BLD AUTO: 0.03 K/UL (ref 0–0.2)
BASOPHILS # BLD AUTO: 0.04 K/UL (ref 0–0.2)
BASOPHILS NFR BLD: 0.3 % (ref 0–1.9)
BASOPHILS NFR BLD: 0.3 % (ref 0–1.9)
BASOPHILS NFR BLD: 0.5 % (ref 0–1.9)
BASOPHILS NFR BLD: 0.5 % (ref 0–1.9)
BENZODIAZ UR QL SCN>200 NG/ML: NEGATIVE
BILIRUB SERPL-MCNC: 1.9 MG/DL (ref 0.1–1)
BILIRUB UR QL STRIP: NEGATIVE
BLD GP AB SCN CELLS X3 SERPL QL: NORMAL
BLD PROD TYP BPU: NORMAL
BLD PROD TYP BPU: NORMAL
BLOOD UNIT EXPIRATION DATE: NORMAL
BLOOD UNIT EXPIRATION DATE: NORMAL
BLOOD UNIT TYPE CODE: 5100
BLOOD UNIT TYPE CODE: 5100
BLOOD UNIT TYPE: NORMAL
BLOOD UNIT TYPE: NORMAL
BUN SERPL-MCNC: 19 MG/DL (ref 6–20)
BZE UR QL SCN: ABNORMAL
CALCIUM SERPL-MCNC: 9 MG/DL (ref 8.7–10.5)
CANNABINOIDS UR QL SCN: NEGATIVE
CHLORIDE SERPL-SCNC: 104 MMOL/L (ref 95–110)
CLARITY UR: CLEAR
CO2 SERPL-SCNC: 15 MMOL/L (ref 23–29)
CODING SYSTEM: NORMAL
CODING SYSTEM: NORMAL
COLOR UR: YELLOW
CREAT SERPL-MCNC: 1.2 MG/DL (ref 0.5–1.4)
CREAT UR-MCNC: 107.8 MG/DL (ref 23–375)
CROSSMATCH INTERPRETATION: NORMAL
CROSSMATCH INTERPRETATION: NORMAL
DIFFERENTIAL METHOD BLD: ABNORMAL
DISPENSE STATUS: NORMAL
DISPENSE STATUS: NORMAL
EOSINOPHIL # BLD AUTO: 0 K/UL (ref 0–0.5)
EOSINOPHIL # BLD AUTO: 0 K/UL (ref 0–0.5)
EOSINOPHIL # BLD AUTO: 0.1 K/UL (ref 0–0.5)
EOSINOPHIL # BLD AUTO: 0.1 K/UL (ref 0–0.5)
EOSINOPHIL NFR BLD: 0.4 % (ref 0–8)
EOSINOPHIL NFR BLD: 0.5 % (ref 0–8)
EOSINOPHIL NFR BLD: 0.8 % (ref 0–8)
EOSINOPHIL NFR BLD: 1 % (ref 0–8)
ERYTHROCYTE [DISTWIDTH] IN BLOOD BY AUTOMATED COUNT: 18.7 % (ref 11.5–14.5)
ERYTHROCYTE [DISTWIDTH] IN BLOOD BY AUTOMATED COUNT: 18.8 % (ref 11.5–14.5)
ERYTHROCYTE [DISTWIDTH] IN BLOOD BY AUTOMATED COUNT: 19.4 % (ref 11.5–14.5)
ERYTHROCYTE [DISTWIDTH] IN BLOOD BY AUTOMATED COUNT: 19.9 % (ref 11.5–14.5)
EST. GFR  (NO RACE VARIABLE): >60 ML/MIN/1.73 M^2
ESTIMATED AVG GLUCOSE: 85 MG/DL (ref 68–131)
ETHANOL SERPL-MCNC: <10 MG/DL
ETHANOL UR-MCNC: <10 MG/DL
FERRITIN SERPL-MCNC: 28 NG/ML (ref 20–300)
FOLATE SERPL-MCNC: 11.3 NG/ML (ref 4–24)
GLUCOSE SERPL-MCNC: 65 MG/DL (ref 70–110)
GLUCOSE UR QL STRIP: NEGATIVE
HBA1C MFR BLD: 4.6 % (ref 4–5.6)
HCT VFR BLD AUTO: 20.2 % (ref 40–54)
HCT VFR BLD AUTO: 21.9 % (ref 40–54)
HCT VFR BLD AUTO: 22.1 % (ref 40–54)
HCT VFR BLD AUTO: 22.6 % (ref 40–54)
HGB BLD-MCNC: 6.5 G/DL (ref 14–18)
HGB BLD-MCNC: 6.9 G/DL (ref 14–18)
HGB BLD-MCNC: 7 G/DL (ref 14–18)
HGB BLD-MCNC: 7.3 G/DL (ref 14–18)
HGB UR QL STRIP: ABNORMAL
HYALINE CASTS #/AREA URNS LPF: 0 /LPF
IMM GRANULOCYTES # BLD AUTO: 0.01 K/UL (ref 0–0.04)
IMM GRANULOCYTES # BLD AUTO: 0.02 K/UL (ref 0–0.04)
IMM GRANULOCYTES # BLD AUTO: 0.03 K/UL (ref 0–0.04)
IMM GRANULOCYTES # BLD AUTO: 0.03 K/UL (ref 0–0.04)
IMM GRANULOCYTES NFR BLD AUTO: 0.2 % (ref 0–0.5)
IMM GRANULOCYTES NFR BLD AUTO: 0.3 % (ref 0–0.5)
IMM GRANULOCYTES NFR BLD AUTO: 0.4 % (ref 0–0.5)
IMM GRANULOCYTES NFR BLD AUTO: 0.4 % (ref 0–0.5)
INR PPP: 1.3 (ref 0.8–1.2)
IRON SERPL-MCNC: 159 UG/DL (ref 45–160)
KETONES UR QL STRIP: ABNORMAL
LDH SERPL L TO P-CCNC: 2.67 MMOL/L (ref 0.5–2.2)
LEUKOCYTE ESTERASE UR QL STRIP: NEGATIVE
LIPASE SERPL-CCNC: 34 U/L (ref 4–60)
LIPASE SERPL-CCNC: 41 U/L (ref 4–60)
LYMPHOCYTES # BLD AUTO: 1.1 K/UL (ref 1–4.8)
LYMPHOCYTES # BLD AUTO: 1.2 K/UL (ref 1–4.8)
LYMPHOCYTES # BLD AUTO: 1.2 K/UL (ref 1–4.8)
LYMPHOCYTES # BLD AUTO: 1.9 K/UL (ref 1–4.8)
LYMPHOCYTES NFR BLD: 15.8 % (ref 18–48)
LYMPHOCYTES NFR BLD: 19.2 % (ref 18–48)
LYMPHOCYTES NFR BLD: 19.5 % (ref 18–48)
LYMPHOCYTES NFR BLD: 21.7 % (ref 18–48)
MCH RBC QN AUTO: 29 PG (ref 27–31)
MCH RBC QN AUTO: 29.2 PG (ref 27–31)
MCH RBC QN AUTO: 29.3 PG (ref 27–31)
MCH RBC QN AUTO: 29.4 PG (ref 27–31)
MCHC RBC AUTO-ENTMCNC: 31.5 G/DL (ref 32–36)
MCHC RBC AUTO-ENTMCNC: 31.7 G/DL (ref 32–36)
MCHC RBC AUTO-ENTMCNC: 32.2 G/DL (ref 32–36)
MCHC RBC AUTO-ENTMCNC: 32.3 G/DL (ref 32–36)
MCV RBC AUTO: 91 FL (ref 82–98)
MCV RBC AUTO: 91 FL (ref 82–98)
MCV RBC AUTO: 92 FL (ref 82–98)
MCV RBC AUTO: 92 FL (ref 82–98)
METHADONE UR QL SCN>300 NG/ML: NEGATIVE
MICROSCOPIC COMMENT: NORMAL
MONOCYTES # BLD AUTO: 0.7 K/UL (ref 0.3–1)
MONOCYTES # BLD AUTO: 0.7 K/UL (ref 0.3–1)
MONOCYTES # BLD AUTO: 0.8 K/UL (ref 0.3–1)
MONOCYTES # BLD AUTO: 1 K/UL (ref 0.3–1)
MONOCYTES NFR BLD: 10.6 % (ref 4–15)
MONOCYTES NFR BLD: 11 % (ref 4–15)
MONOCYTES NFR BLD: 11.1 % (ref 4–15)
MONOCYTES NFR BLD: 12.1 % (ref 4–15)
NEUTROPHILS # BLD AUTO: 4.2 K/UL (ref 1.8–7.7)
NEUTROPHILS # BLD AUTO: 4.2 K/UL (ref 1.8–7.7)
NEUTROPHILS # BLD AUTO: 5 K/UL (ref 1.8–7.7)
NEUTROPHILS # BLD AUTO: 5.5 K/UL (ref 1.8–7.7)
NEUTROPHILS NFR BLD: 64.8 % (ref 38–73)
NEUTROPHILS NFR BLD: 68.1 % (ref 38–73)
NEUTROPHILS NFR BLD: 68.4 % (ref 38–73)
NEUTROPHILS NFR BLD: 72.1 % (ref 38–73)
NITRITE UR QL STRIP: NEGATIVE
NRBC BLD-RTO: 0 /100 WBC
NUM UNITS TRANS PACKED RBC: NORMAL
OHS QRS DURATION: 78 MS
OHS QTC CALCULATION: 474 MS
OPIATES UR QL SCN: NEGATIVE
PCP UR QL SCN>25 NG/ML: NEGATIVE
PH UR STRIP: 6 [PH] (ref 5–8)
PLATELET # BLD AUTO: 117 K/UL (ref 150–450)
PLATELET # BLD AUTO: 134 K/UL (ref 150–450)
PLATELET # BLD AUTO: 144 K/UL (ref 150–450)
PLATELET # BLD AUTO: 175 K/UL (ref 150–450)
PMV BLD AUTO: 8.9 FL (ref 9.2–12.9)
PMV BLD AUTO: 9.1 FL (ref 9.2–12.9)
PMV BLD AUTO: 9.2 FL (ref 9.2–12.9)
PMV BLD AUTO: 9.3 FL (ref 9.2–12.9)
POCT GLUCOSE: 101 MG/DL (ref 70–110)
POCT GLUCOSE: 217 MG/DL (ref 70–110)
POCT GLUCOSE: 64 MG/DL (ref 70–110)
POCT GLUCOSE: 76 MG/DL (ref 70–110)
POTASSIUM SERPL-SCNC: 4.1 MMOL/L (ref 3.5–5.1)
PROT SERPL-MCNC: 9.6 G/DL (ref 6–8.4)
PROT UR QL STRIP: ABNORMAL
PROTHROMBIN TIME: 14.3 SEC (ref 9–12.5)
RBC # BLD AUTO: 2.22 M/UL (ref 4.6–6.2)
RBC # BLD AUTO: 2.38 M/UL (ref 4.6–6.2)
RBC # BLD AUTO: 2.4 M/UL (ref 4.6–6.2)
RBC # BLD AUTO: 2.48 M/UL (ref 4.6–6.2)
RBC #/AREA URNS HPF: 1 /HPF (ref 0–4)
SAMPLE: ABNORMAL
SATURATED IRON: 35 % (ref 20–50)
SODIUM SERPL-SCNC: 137 MMOL/L (ref 136–145)
SP GR UR STRIP: 1.02 (ref 1–1.03)
SPECIMEN OUTDATE: NORMAL
TOTAL IRON BINDING CAPACITY: 454 UG/DL (ref 250–450)
TOXICOLOGY INFORMATION: ABNORMAL
TRANS ERYTHROCYTES VOL PATIENT: NORMAL ML
TRANSFERRIN SERPL-MCNC: 307 MG/DL (ref 200–375)
URN SPEC COLLECT METH UR: ABNORMAL
UROBILINOGEN UR STRIP-ACNC: NEGATIVE EU/DL
VIT B12 SERPL-MCNC: 549 PG/ML (ref 210–950)
WBC # BLD AUTO: 6.1 K/UL (ref 3.9–12.7)
WBC # BLD AUTO: 6.16 K/UL (ref 3.9–12.7)
WBC # BLD AUTO: 6.89 K/UL (ref 3.9–12.7)
WBC # BLD AUTO: 8.51 K/UL (ref 3.9–12.7)
WBC #/AREA URNS HPF: 1 /HPF (ref 0–5)

## 2024-06-06 PROCEDURE — 96368 THER/DIAG CONCURRENT INF: CPT

## 2024-06-06 PROCEDURE — 99285 EMERGENCY DEPT VISIT HI MDM: CPT | Mod: 25

## 2024-06-06 PROCEDURE — 96375 TX/PRO/DX INJ NEW DRUG ADDON: CPT

## 2024-06-06 PROCEDURE — 82607 VITAMIN B-12: CPT | Performed by: HOSPITALIST

## 2024-06-06 PROCEDURE — 96361 HYDRATE IV INFUSION ADD-ON: CPT

## 2024-06-06 PROCEDURE — 25000003 PHARM REV CODE 250: Performed by: HOSPITALIST

## 2024-06-06 PROCEDURE — C9113 INJ PANTOPRAZOLE SODIUM, VIA: HCPCS | Performed by: HOSPITALIST

## 2024-06-06 PROCEDURE — 86920 COMPATIBILITY TEST SPIN: CPT | Performed by: INTERNAL MEDICINE

## 2024-06-06 PROCEDURE — 82728 ASSAY OF FERRITIN: CPT | Performed by: HOSPITALIST

## 2024-06-06 PROCEDURE — 83036 HEMOGLOBIN GLYCOSYLATED A1C: CPT | Performed by: HOSPITALIST

## 2024-06-06 PROCEDURE — 85610 PROTHROMBIN TIME: CPT | Performed by: HOSPITALIST

## 2024-06-06 PROCEDURE — 96376 TX/PRO/DX INJ SAME DRUG ADON: CPT

## 2024-06-06 PROCEDURE — 96366 THER/PROPH/DIAG IV INF ADDON: CPT

## 2024-06-06 PROCEDURE — 82746 ASSAY OF FOLIC ACID SERUM: CPT | Performed by: HOSPITALIST

## 2024-06-06 PROCEDURE — G0378 HOSPITAL OBSERVATION PER HR: HCPCS

## 2024-06-06 PROCEDURE — P9016 RBC LEUKOCYTES REDUCED: HCPCS | Performed by: INTERNAL MEDICINE

## 2024-06-06 PROCEDURE — 63600175 PHARM REV CODE 636 W HCPCS: Performed by: INTERNAL MEDICINE

## 2024-06-06 PROCEDURE — 87040 BLOOD CULTURE FOR BACTERIA: CPT | Performed by: INTERNAL MEDICINE

## 2024-06-06 PROCEDURE — 82010 KETONE BODYS QUAN: CPT | Performed by: HOSPITALIST

## 2024-06-06 PROCEDURE — 80053 COMPREHEN METABOLIC PANEL: CPT | Performed by: INTERNAL MEDICINE

## 2024-06-06 PROCEDURE — 80307 DRUG TEST PRSMV CHEM ANLYZR: CPT | Performed by: HOSPITALIST

## 2024-06-06 PROCEDURE — 93005 ELECTROCARDIOGRAM TRACING: CPT

## 2024-06-06 PROCEDURE — 83540 ASSAY OF IRON: CPT | Performed by: HOSPITALIST

## 2024-06-06 PROCEDURE — 81000 URINALYSIS NONAUTO W/SCOPE: CPT | Performed by: INTERNAL MEDICINE

## 2024-06-06 PROCEDURE — 85025 COMPLETE CBC W/AUTO DIFF WBC: CPT | Mod: 91 | Performed by: HOSPITALIST

## 2024-06-06 PROCEDURE — C9113 INJ PANTOPRAZOLE SODIUM, VIA: HCPCS | Performed by: INTERNAL MEDICINE

## 2024-06-06 PROCEDURE — 85025 COMPLETE CBC W/AUTO DIFF WBC: CPT | Performed by: INTERNAL MEDICINE

## 2024-06-06 PROCEDURE — 63600175 PHARM REV CODE 636 W HCPCS: Performed by: HOSPITALIST

## 2024-06-06 PROCEDURE — 83605 ASSAY OF LACTIC ACID: CPT

## 2024-06-06 PROCEDURE — 93010 ELECTROCARDIOGRAM REPORT: CPT | Mod: ,,, | Performed by: INTERNAL MEDICINE

## 2024-06-06 PROCEDURE — 99900035 HC TECH TIME PER 15 MIN (STAT)

## 2024-06-06 PROCEDURE — 36430 TRANSFUSION BLD/BLD COMPNT: CPT

## 2024-06-06 PROCEDURE — 82962 GLUCOSE BLOOD TEST: CPT

## 2024-06-06 PROCEDURE — 87154 CUL TYP ID BLD PTHGN 6+ TRGT: CPT | Performed by: INTERNAL MEDICINE

## 2024-06-06 PROCEDURE — 96365 THER/PROPH/DIAG IV INF INIT: CPT | Mod: 59

## 2024-06-06 PROCEDURE — 83690 ASSAY OF LIPASE: CPT | Performed by: INTERNAL MEDICINE

## 2024-06-06 PROCEDURE — 36415 COLL VENOUS BLD VENIPUNCTURE: CPT | Performed by: INTERNAL MEDICINE

## 2024-06-06 PROCEDURE — 87076 CULTURE ANAEROBE IDENT EACH: CPT | Performed by: INTERNAL MEDICINE

## 2024-06-06 PROCEDURE — 94761 N-INVAS EAR/PLS OXIMETRY MLT: CPT

## 2024-06-06 PROCEDURE — 82077 ASSAY SPEC XCP UR&BREATH IA: CPT | Performed by: INTERNAL MEDICINE

## 2024-06-06 PROCEDURE — 25000003 PHARM REV CODE 250: Performed by: INTERNAL MEDICINE

## 2024-06-06 PROCEDURE — P9021 RED BLOOD CELLS UNIT: HCPCS | Performed by: INTERNAL MEDICINE

## 2024-06-06 PROCEDURE — 86901 BLOOD TYPING SEROLOGIC RH(D): CPT | Performed by: INTERNAL MEDICINE

## 2024-06-06 PROCEDURE — 83690 ASSAY OF LIPASE: CPT | Mod: 91 | Performed by: HOSPITALIST

## 2024-06-06 RX ORDER — LANOLIN ALCOHOL/MO/W.PET/CERES
100 CREAM (GRAM) TOPICAL DAILY
Status: DISCONTINUED | OUTPATIENT
Start: 2024-06-06 | End: 2024-06-07 | Stop reason: HOSPADM

## 2024-06-06 RX ORDER — IBUPROFEN 200 MG
16 TABLET ORAL
Status: DISCONTINUED | OUTPATIENT
Start: 2024-06-06 | End: 2024-06-07 | Stop reason: HOSPADM

## 2024-06-06 RX ORDER — ONDANSETRON HYDROCHLORIDE 2 MG/ML
4 INJECTION, SOLUTION INTRAVENOUS
Status: COMPLETED | OUTPATIENT
Start: 2024-06-06 | End: 2024-06-06

## 2024-06-06 RX ORDER — FOLIC ACID 1 MG/1
1 TABLET ORAL DAILY
Status: DISCONTINUED | OUTPATIENT
Start: 2024-06-06 | End: 2024-06-07 | Stop reason: HOSPADM

## 2024-06-06 RX ORDER — PANTOPRAZOLE SODIUM 40 MG/10ML
40 INJECTION, POWDER, LYOPHILIZED, FOR SOLUTION INTRAVENOUS 2 TIMES DAILY
Status: DISCONTINUED | OUTPATIENT
Start: 2024-06-06 | End: 2024-06-06

## 2024-06-06 RX ORDER — SODIUM CHLORIDE, SODIUM LACTATE, POTASSIUM CHLORIDE, CALCIUM CHLORIDE 600; 310; 30; 20 MG/100ML; MG/100ML; MG/100ML; MG/100ML
INJECTION, SOLUTION INTRAVENOUS CONTINUOUS
Status: DISCONTINUED | OUTPATIENT
Start: 2024-06-06 | End: 2024-06-07 | Stop reason: HOSPADM

## 2024-06-06 RX ORDER — DEXTROSE MONOHYDRATE 100 MG/ML
INJECTION, SOLUTION INTRAVENOUS CONTINUOUS
Status: DISCONTINUED | OUTPATIENT
Start: 2024-06-06 | End: 2024-06-06

## 2024-06-06 RX ORDER — HYDROCODONE BITARTRATE AND ACETAMINOPHEN 500; 5 MG/1; MG/1
TABLET ORAL
Status: DISCONTINUED | OUTPATIENT
Start: 2024-06-06 | End: 2024-06-07 | Stop reason: HOSPADM

## 2024-06-06 RX ORDER — GLUCAGON 1 MG
1 KIT INJECTION
Status: DISCONTINUED | OUTPATIENT
Start: 2024-06-06 | End: 2024-06-07 | Stop reason: HOSPADM

## 2024-06-06 RX ORDER — OXYCODONE HYDROCHLORIDE 5 MG/1
5 TABLET ORAL EVERY 4 HOURS PRN
Status: DISCONTINUED | OUTPATIENT
Start: 2024-06-06 | End: 2024-06-07 | Stop reason: HOSPADM

## 2024-06-06 RX ORDER — DIAZEPAM 5 MG/1
5 TABLET ORAL EVERY 6 HOURS PRN
Status: DISCONTINUED | OUTPATIENT
Start: 2024-06-06 | End: 2024-06-07 | Stop reason: HOSPADM

## 2024-06-06 RX ORDER — HYDROCODONE BITARTRATE AND ACETAMINOPHEN 500; 5 MG/1; MG/1
TABLET ORAL
Status: DISCONTINUED | OUTPATIENT
Start: 2024-06-07 | End: 2024-06-07 | Stop reason: HOSPADM

## 2024-06-06 RX ORDER — PANTOPRAZOLE SODIUM 40 MG/10ML
80 INJECTION, POWDER, LYOPHILIZED, FOR SOLUTION INTRAVENOUS
Status: COMPLETED | OUTPATIENT
Start: 2024-06-06 | End: 2024-06-06

## 2024-06-06 RX ORDER — PANTOPRAZOLE SODIUM 40 MG/10ML
40 INJECTION, POWDER, LYOPHILIZED, FOR SOLUTION INTRAVENOUS EVERY 12 HOURS
Status: DISCONTINUED | OUTPATIENT
Start: 2024-06-06 | End: 2024-06-07 | Stop reason: HOSPADM

## 2024-06-06 RX ORDER — IBUPROFEN 200 MG
24 TABLET ORAL
Status: DISCONTINUED | OUTPATIENT
Start: 2024-06-06 | End: 2024-06-07 | Stop reason: HOSPADM

## 2024-06-06 RX ADMIN — FOLIC ACID 1 MG: 1 TABLET ORAL at 09:06

## 2024-06-06 RX ADMIN — OXYCODONE HYDROCHLORIDE 5 MG: 5 TABLET ORAL at 10:06

## 2024-06-06 RX ADMIN — DEXTROSE MONOHYDRATE: 100 INJECTION, SOLUTION INTRAVENOUS at 06:06

## 2024-06-06 RX ADMIN — CEFTRIAXONE SODIUM 1 G: 1 INJECTION, POWDER, FOR SOLUTION INTRAMUSCULAR; INTRAVENOUS at 06:06

## 2024-06-06 RX ADMIN — ONDANSETRON 4 MG: 2 INJECTION INTRAMUSCULAR; INTRAVENOUS at 04:06

## 2024-06-06 RX ADMIN — SODIUM CHLORIDE, SODIUM LACTATE, POTASSIUM CHLORIDE, AND CALCIUM CHLORIDE: 600; 310; 30; 20 INJECTION, SOLUTION INTRAVENOUS at 10:06

## 2024-06-06 RX ADMIN — Medication 100 MG: at 09:06

## 2024-06-06 RX ADMIN — THERA TABS 1 TABLET: TAB at 09:06

## 2024-06-06 RX ADMIN — PANTOPRAZOLE SODIUM 40 MG: 40 INJECTION, POWDER, LYOPHILIZED, FOR SOLUTION INTRAVENOUS at 09:06

## 2024-06-06 RX ADMIN — PANTOPRAZOLE SODIUM 80 MG: 40 INJECTION, POWDER, LYOPHILIZED, FOR SOLUTION INTRAVENOUS at 04:06

## 2024-06-06 RX ADMIN — OCTREOTIDE ACETATE 50 MCG/HR: 500 INJECTION, SOLUTION INTRAVENOUS; SUBCUTANEOUS at 10:06

## 2024-06-06 RX ADMIN — PANTOPRAZOLE SODIUM 40 MG: 40 INJECTION, POWDER, LYOPHILIZED, FOR SOLUTION INTRAVENOUS at 10:06

## 2024-06-06 RX ADMIN — Medication 16 G: at 05:06

## 2024-06-06 RX ADMIN — OCTREOTIDE ACETATE 50 MCG/HR: 500 INJECTION, SOLUTION INTRAVENOUS; SUBCUTANEOUS at 07:06

## 2024-06-06 RX ADMIN — CEFTRIAXONE SODIUM 1 G: 1 INJECTION, POWDER, FOR SOLUTION INTRAMUSCULAR; INTRAVENOUS at 09:06

## 2024-06-06 NOTE — H&P
[facility] Medicine  History & Physical    Patient Name: Joel Grant  MRN: [id number]  Patient Class: OP- Observation  Admission Date: 6/6/2024  Attending Physician: Jeffry Aj MD  Primary Care Provider: [facility]         Patient information was obtained from patient, past medical records, and ER records.     Subjective:     Principal Problem:Upper GI bleeding    Chief Complaint:   Chief Complaint   Patient presents with    Hematemesis     Hematemesis and bloody stool  x 24 hrs, hx of cirrhosis, last drink was yesterday        HPI: Mr. Grant is a 54-year-old man with alcoholic cirrhosis, gout, periodic cocaine use prior upper GI bleeding with varices and portal hypertensive gastropathy who presented for evaluation of hematemesis.  He reports he felt well until waking early this morning with acute onset nausea.  He states he typically feels nausea like this when he is worrying a lot, and he notes he recently received an eviction notice.  Shortly after waking, he had an episode of vomiting up a dark coffee ground substance.  This was was shortly followed by a small dark black bowel movement.  Due to this being similar to a prior episode of GI bleeding he presented to the hospital for evaluation.  He notes feeling light headed around the time of his vomiting this morning.  He denies fevers, chills, abdominal pain, chest pain, shortness of breath, headache, palpitations and swelling.  He notes he usually drinks about a pint of vodka daily and his last drink was 2 days prior to admit.  He denies alcoholism and notes he has never had alcohol withdrawals.  He admits he did use cocaine on the day prior to admission.      Past Medical History:   Diagnosis Date    Alcoholism     Cirrhosis of liver     Cocaine abuse     Esophageal varices     Gout attack     Unspecified cirrhosis of liver     Upper GI bleed        Past Surgical History:   Procedure Laterality Date     "ESOPHAGOGASTRODUODENOSCOPY         Review of patient's allergies indicates:  No Known Allergies    No current facility-administered medications on file prior to encounter.     Current Outpatient Medications on File Prior to Encounter   Medication Sig    gabapentin (NEURONTIN) 300 MG capsule Take 1 capsule (300 mg total) by mouth 3 (three) times daily.    oxyCODONE (ROXICODONE) 5 MG immediate release tablet Take 1 tablet (5 mg total) by mouth every 4 (four) hours as needed for Pain.    thiamine 100 MG tablet Take 1 tablet (100 mg total) by mouth once daily.     Family History    Family history is unknown by patient.       Tobacco Use    Smoking status: Never    Smokeless tobacco: Not on file   Substance and Sexual Activity    Alcohol use: Yes     Alcohol/week: 28.0 standard drinks of alcohol     Types: 28 Cans of beer per week     Comment: Drinks 1pint of vodka most days.  Last drink 6/4/24.    Drug use: Yes     Types: "Crack" cocaine     Comment: last cocaine use reported 6/5/24 - prior that several years    Sexual activity: Not Currently     Review of Systems   All other systems reviewed and are negative.    Objective:     Vital Signs (Most Recent):  Temp: 98.8 °F (37.1 °C) (06/06/24 0731)  Pulse: 77 (06/06/24 1103)  Resp: 20 (06/06/24 0731)  BP: 127/68 (06/06/24 1103)  SpO2: 100 % (06/06/24 1103) Vital Signs (24h Range):  Temp:  [98.4 °F (36.9 °C)-98.8 °F (37.1 °C)] 98.8 °F (37.1 °C)  Pulse:  [77-99] 77  Resp:  [16-20] 20  SpO2:  [97 %-100 %] 100 %  BP: (125-151)/(68-86) 127/68     Weight: 79.4 kg (175 lb)  Body mass index is 25.84 kg/m².     Physical Exam  Vitals and nursing note reviewed.   Constitutional:       General: He is not in acute distress.     Appearance: He is well-developed. He is ill-appearing. He is not toxic-appearing.   HENT:      Head: Normocephalic and atraumatic.      Right Ear: External ear normal.      Left Ear: External ear normal.      Nose: Nose normal.      Mouth/Throat:      Mouth: " Mucous membranes are moist.   Eyes:      Extraocular Movements: Extraocular movements intact.      Conjunctiva/sclera: Conjunctivae normal.   Cardiovascular:      Rate and Rhythm: Normal rate and regular rhythm.      Heart sounds: Normal heart sounds.   Pulmonary:      Effort: Pulmonary effort is normal. No respiratory distress.      Breath sounds: Normal breath sounds.   Abdominal:      General: Bowel sounds are normal. There is no distension.      Palpations: Abdomen is soft.      Tenderness: There is no abdominal tenderness.   Musculoskeletal:         General: Normal range of motion.      Cervical back: Normal range of motion. No rigidity.      Right lower leg: No edema.      Left lower leg: No edema.   Skin:     General: Skin is warm and dry.   Neurological:      Mental Status: He is alert and oriented to person, place, and time.      Cranial Nerves: No cranial nerve deficit.      Coordination: Coordination normal.   Psychiatric:         Mood and Affect: Mood normal.         Behavior: Behavior normal.                Significant Labs: All pertinent labs within the past 24 hours have been reviewed.    Significant Imaging: I have reviewed all pertinent imaging results/findings within the past 24 hours.  Assessment/Plan:     * Upper GI bleeding  -Placed in observation  -Presented with one episode of coffee ground emesis and melena with Hb of 6.5.  -Noted history of alcohol related cirrhosis, non-bleeding varices, PUD and portal hypertensive gastropathy all in the context of continued heavy alcohol use and medication non-compliance  -GI consulted and input appreciated.  Discussed plan of care with Dr. Grant today  -Monitor cbc q8h.  Transfuse to keep Hb > 7.  -Treat with iv PPI q12 and octreotide drip  -Allow clear liquids today and NPO tonight for EGD tomorrow.    Alcoholic cirrhosis  -History noted.  -On admit INR 1.3, Na normal, mild anion-gap acidosis,  and ALT 46.  -Needs to stop drinking - does not  believe he is an alcoholic.  -Beta-hydroxybutyrate normal.  EtOH not detectable  -Counseled on cessation.  -Repeat cmp in AM    Acute blood loss anemia  -Hb 6.5 on admit  -Likely due to both acute blood loss anemia and chronic anemia  -Check iron, ferritin, b12 and folate  -Transfuse to keep Hb > 7  -Treat GI bleed as above  -Repeat cbc as above.    Cocaine abuse  -Admits to use on day prior to admit.  States last use before that was several years  -UDS positive for cocaine  -Counseled on risks to ongoing use and need to abstain from this.    Alcohol abuse  -Drinks 1 pint vodka daily with last drink on 6/4/24.  -Strongly does not believe he is an alcoholic and has never had withdrawal symptoms  -Counseled on cessation and risks of ongoing alcohol abuse  -Monitor CIWA Q4h  -Treat with gentle IV fluids, folic acid, thiamine and MVI      VTE Risk Mitigation (From admission, onward)           Ordered     Place sequential compression device  Until discontinued         06/06/24 0733     IP VTE LOW RISK PATIENT  Once         06/06/24 0733                       On 06/06/2024, patient should be placed in hospital observation services under my care.             Jeffry Aj MD  Department of Hospital Medicine  [facility] - Emergency Dept

## 2024-06-06 NOTE — ED TRIAGE NOTES
Joelana Grant, a 54 y.o. male presents to the ED c/o hematemesis and bloody stool that started couple of hours ago.    Patient is A&Ox4. Denies any other complaints. ED workup in progress. Safety measures in place; side rails up x2. Call light within pt reach. Plan of care ongoing.    Chief Complaint   Patient presents with    Hematemesis     Hematemesis and bloody stool  x 24 hrs, hx of cirrhosis, last drink was yesterday     Review of patient's allergies indicates:  No Known Allergies  Past Medical History:   Diagnosis Date    Cirrhosis of liver     Gout attack     Unspecified cirrhosis of liver

## 2024-06-06 NOTE — ASSESSMENT & PLAN NOTE
-History noted.  -On admit INR 1.3, Na normal, mild anion-gap acidosis,  and ALT 46.  -Needs to stop drinking - does not believe he is an alcoholic.  -Beta-hydroxybutyrate normal.  EtOH not detectable  -Counseled on cessation.  -Repeat cmp in AM

## 2024-06-06 NOTE — ASSESSMENT & PLAN NOTE
-Admits to use on day prior to admit.  States last use before that was several years  -UDS positive for cocaine  -Counseled on risks to ongoing use and need to abstain from this.

## 2024-06-06 NOTE — ASSESSMENT & PLAN NOTE
-Hb 6.5 on admit  -Likely due to both acute blood loss anemia and chronic anemia  -Check iron, ferritin, b12 and folate  -Transfuse to keep Hb > 7  -Treat GI bleed as above  -Repeat cbc as above.

## 2024-06-06 NOTE — CONSULTS
.[facility] - Emergency Dept  Gastroenterology  Consult Note    Patient Name: Joel Grant  MRN: [id number]  Admission Date: 6/6/2024  Hospital Length of Stay: 0 days  Code Status: Full Code   Primary Care Physician: No, Primary Doctor  Principal Problem:Upper GI bleeding    Inpatient consult to Gastroenterology  Consult performed by: Al Grant MD  Consult ordered by: Maurice Hodges MD        Subjective:     Chief complaint:  Black stool, vomiting    HPI:  54-year-old man who was awoken early this morning by nausea and vomiting.  He reports the emesis looked dark brown, like coffee-grounds.  He also noticed a black bowel movement.  He has a history of an upper GI bleed so came to the emergency department.  There has been no more vomiting since he has been here.  He says he felt a little lightheaded before coming in, but has been hemodynamically stable.  No fever or chills.  No abdominal pain.    He has a history of cirrhosis from alcohol.  No known history of varices.  He reports being noncompliant with medication and continues to drink heavily.    In November, 2022, he presented to tomorrow with the same symptoms of coffee-ground emesis and melena.  Underwent an EGD at that time which revealed a gastric ulcer with no stigmata bleeding and severe reflux esophagitis.    Past medical history:  Past Medical History:   Diagnosis Date    Cirrhosis of liver     Gout attack     Unspecified cirrhosis of liver        Past surgical history:  History reviewed. No pertinent surgical history.    Social history:  Social History     Socioeconomic History    Marital status: Single   Tobacco Use    Smoking status: Never   Substance and Sexual Activity    Alcohol use: Yes     Alcohol/week: 28.0 standard drinks of alcohol     Types: 28 Cans of beer per week     Comment: 4 can of beer per day    Drug use: Not Currently     Comment: crack    Sexual activity: Not Currently       Family history:  No family history on  "file.    Medications:  (Not in a hospital admission)      Allergies:  Review of patient's allergies indicates:  No Known Allergies    Review of systems:  CONSTITUTIONAL: Negative for fever, chills, weight loss, weight gain.  HEENT: Negative for hearing or vision changes, nasal congestion, dry mouth, sore throat.  CARDIOVASCULAR: Negative for chest pain or palpitations.  RESPIRATORY: Negative for SOB or cough.  GASTROINTESTINAL: See HPI  GENITOURINARY: Negative for dysuria or hematuria.  MUSCULOSKELETAL: Negative for joint or muscle pain.  NEUROLOGIC: Negative for headaches  Aside from above positives, complete 10 point review of systems negative.    Objective:     Vital Signs (Most Recent):  Temp: 98.8 °F (37.1 °C) (06/06/24 0731)  Pulse: 82 (06/06/24 0731)  Resp: 20 (06/06/24 0731)  BP: 132/72 (06/06/24 0731)  SpO2: 100 % (06/06/24 0731) Vital Signs (24h Range):  Temp:  [98.4 °F (36.9 °C)-98.8 °F (37.1 °C)] 98.8 °F (37.1 °C)  Pulse:  [82-99] 82  Resp:  [16-20] 20  SpO2:  [97 %-100 %] 100 %  BP: (125-151)/(72-86) 132/72     Physical examination:  General: well developed, well nourished, no apparent distress  HENT: NCAT, hearing grossly intact  Eyes:  anicteric sclera  Cardiovascular: Regular rate and rhythm.   Lungs: Non-labored respirations. Breath sounds equal.   Abdomen: soft, NTND, normoactive BS  Extremities: No C/C/E  Neuro: AA&O x 3  Psych: Appropriate mood and affect.   Skin: No jaundice  Musculoskeletal: no deformity    Labs:  CBC:   Recent Labs   Lab 06/06/24 0428   WBC 8.51   HGB 7.0*   HCT 22.1*        CMP:   Recent Labs   Lab 06/06/24 0428   GLU 65*   CALCIUM 9.0   ALBUMIN 3.2*   PROT 9.6*      K 4.1   CO2 15*      BUN 19   CREATININE 1.2   ALKPHOS 117   ALT 46*   *   BILITOT 1.9*     Coagulation: No results for input(s): "PT", "INR", "APTT" in the last 48 hours.    Imaging:  No imaging this admission.  Old ultrasound reviewed.      Assessment:   54-year-old man with melena, " anemia, and coffee-ground emesis.  He has a history of cirrhosis.  He had a similar presentation in 2022 and was found to have a gastric ulcer and severe esophagitis.  Esophagitis is a common cause of coffee-ground emesis.  He has been appropriately started on a PPI and Sandostatin.  He is currently receiving blood.  Doing an EGD was discussed.  He is hesitant to do it and does not want to do it today, but agrees to do it in the morning.    Plan:   1. Blood transfusion as you were doing   2. Continue PPI and Sandostatin  3. Monitor hemoglobin   4. EGD in the morning.  It can be done sooner if there is further bleeding.    5. Avoid alcohol      Discussed with Dr. Aj      Thank you for your consult.     Al Grant MD  Gastroenterology  [facility] - Emergency Dept

## 2024-06-06 NOTE — ED PROVIDER NOTES
"Encounter date: 4:00 AM 06/06/2024    Source of History   Patient    Chief Complaint   Pt presents with:   Hematemesis (Hematemesis and bloody stool  x 24 hrs, hx of cirrhosis, last drink was yesterday)      History Of Present Illness   Joel Grant is a 54 y.o. male with history of alcoholic cirrhosis of the liver without ascites, upper GI bleed, portal hypertensive gastropathy esophageal varices without bleeding who presents to the ED with hematemesis beginning one hour prior to arrival in the ED. Patient describes the emesis as "coffee ground like" and reports that it has occurred approximately eight times in the last hour. He also reports dark and tarry stool and light headedness with similar onsets. Patient notes he has had these symptoms before and that he also has a history of liver xerosis and a history of anemia. Patient reports that he has been noncompliant with home medications for a while and that he still drinks alcohol. He denies eating today, a history of esophageal varices, chest pain, SOB, fever, and leg swelling but does note occasional abdominal pain with stress, no active abdominal pain at this point in time.     This is the extent to the patients complaints today here in the emergency department.  Past History   Review of patient's allergies indicates:  No Known Allergies    No current facility-administered medications on file prior to encounter.     Current Outpatient Medications on File Prior to Encounter   Medication Sig Dispense Refill    gabapentin (NEURONTIN) 300 MG capsule Take 1 capsule (300 mg total) by mouth 3 (three) times daily. 20 capsule 0    oxyCODONE (ROXICODONE) 5 MG immediate release tablet Take 1 tablet (5 mg total) by mouth every 4 (four) hours as needed for Pain. 10 tablet 0    thiamine 100 MG tablet Take 1 tablet (100 mg total) by mouth once daily. 30 tablet 0       As per HPI and below:  Past Medical History:   Diagnosis Date    Cirrhosis of liver     Gout attack     " "Unspecified cirrhosis of liver      History reviewed. No pertinent surgical history.    Social History     Socioeconomic History    Marital status: Single   Tobacco Use    Smoking status: Never   Substance and Sexual Activity    Alcohol use: Yes     Alcohol/week: 28.0 standard drinks of alcohol     Types: 28 Cans of beer per week     Comment: 4 can of beer per day    Drug use: Not Currently     Comment: crack    Sexual activity: Not Currently       No family history on file.    Physical Exam     Vitals:    06/06/24 0353 06/06/24 0458   BP: (!) 151/86 137/77   BP Location: Left arm Right arm   Patient Position: Sitting Lying   Pulse: 99 91   Resp: 16 18   Temp: 98.4 °F (36.9 °C)    TempSrc: Oral    SpO2: 97% 100%   Weight: 79.4 kg (175 lb)    Height: 5' 9" (1.753 m)      Physical Exam:   Nursing note and vitals reviewed.  Appearance:  Chronically ill, nontoxic elderly male in no acute respiratory distress.  Making purposeful movements.  Speaking in full sentences.  Skin: No obvious rashes seen.  Good turgor.  No abrasions.  No ecchymoses.  Eyes: No conjunctival injection. EOMI, PERRL.  Head: NC/AT  Chest: CTAB. No increased work of breathing, bilateral chest rise.  Cardiovascular: Regular rate and rhythm.  Normal equal bilateral radial pulses.  No JVD.  No lower extremity edema  Abdomen: Soft.  Not distended.  Nontender.  No guarding.  No rebound. No Masses.  Patient declined digital rectal exam  Musculoskeletal: No obvious deformities.   Neck supple, full range of motion, no obvious deformity.   No tenderness to palpation of cervical through lumbar spine.  No step-offs or deformities. Good range of motion all joints.  Neurologic: Moves all extremities.  Normal sensation.  No facial droop.  Normal speech.    Mental Status:  Alert and oriented x 3.  Appropriate, conversant.      Results and Medications    Procedures    Labs Reviewed   CBC W/ AUTO DIFFERENTIAL - Abnormal; Notable for the following components:       " Result Value    RBC 2.40 (*)     Hemoglobin 7.0 (*)     Hematocrit 22.1 (*)     MCHC 31.7 (*)     RDW 19.9 (*)     All other components within normal limits   COMPREHENSIVE METABOLIC PANEL - Abnormal; Notable for the following components:    CO2 15 (*)     Glucose 65 (*)     Total Protein 9.6 (*)     Albumin 3.2 (*)     Total Bilirubin 1.9 (*)      (*)     ALT 46 (*)     Anion Gap 18 (*)     All other components within normal limits   URINALYSIS, REFLEX TO URINE CULTURE - Abnormal; Notable for the following components:    Protein, UA 1+ (*)     Ketones, UA 1+ (*)     Occult Blood UA 1+ (*)     All other components within normal limits    Narrative:     Specimen Source->Urine   POCT GLUCOSE - Abnormal; Notable for the following components:    POCT Glucose 64 (*)     All other components within normal limits   CULTURE, BLOOD   CULTURE, BLOOD   LIPASE   ALCOHOL,MEDICAL (ETHANOL)   ALCOHOL,MEDICAL (ETHANOL)   URINALYSIS MICROSCOPIC    Narrative:     Specimen Source->Urine   TYPE & SCREEN   POCT GLUCOSE   POCT GLUCOSE MONITORING CONTINUOUS   POCT GLUCOSE MONITORING CONTINUOUS   PREPARE RBC SOFT       Imaging Results    None             Medications   dextrose 10 % infusion (has no administration in time range)   glucose chewable tablet 16 g (16 g Oral Given 6/6/24 0539)   glucose chewable tablet 24 g (has no administration in time range)   glucagon (human recombinant) injection 1 mg (has no administration in time range)   dextrose 10% bolus 125 mL 125 mL (has no administration in time range)   dextrose 10% bolus 250 mL 250 mL (has no administration in time range)   cefTRIAXone (Rocephin) 1 g in dextrose 5 % in water (D5W) 100 mL IVPB (MB+) (has no administration in time range)   0.9%  NaCl infusion (for blood administration) (has no administration in time range)   pantoprazole injection 80 mg (80 mg Intravenous Given 6/6/24 6184)   ondansetron injection 4 mg (4 mg Intravenous Given 6/6/24 8144)       MAURIZIO,  Impression and Plan   Previous Records:   I decided to obtain old medical records which is listed here or in ED course:     Independently Interpreted Test(s):   EKG:  I independently reviewed and interpreted the EKG and my findings are as follows:   Detailed here or in ED course.     Clinical Tests:   Lab Tests: Ordered and Reviewed  Radiological Study: Ordered and Reviewed  Medical Tests: Ordered and Reviewed    Differential diagnosis:  -upper GI bleed  -peptic ulcer disease   -esophageal varices   -gastritis versus esophagitis   -lower GI bleed   -anemia   -hypoglycemia  -UTI  -unlikely variceal bleed    Initial Assessment & ED Management:    Urgent evaluation of 54 y.o. male with history of alcoholic cirrhosis of the liver without ascites, upper GI bleed, portal hypertensive gastropathy esophageal varices without bleeding who presents to the ED with hematemesis beginning one hour prior to arrival in the ED. Upon arrival to the ED, patient presents hemodynamically stable and afebrile with no respiratory distress.  Noted to have coffee-ground emesis in his emesis container.  Patient declined digital rectal exam.  Hemoglobin 7, recent baseline 7.6.  Typed and screened; consented for blood and ordered a unit of blood due to his active bleeding.  His glucose was noted to be low on CMP given glucose tablets will give D10 will continue to monitor closely for hypoglycemia.  Lipase within normal limits making pancreatitis unlikely.  Urinalysis without UTI.  Blood cultures were drawn and he was given IV Rocephin.    Noted anion gap, VBG lactic ordered.  I called and discussed the case with Blaise Nix MD with GI who recommended admission to hospital medicine and states he will see the patient in the morning in his agreeable with admission to hospital medicine.  I then discussed the case with Hospital Medicine who was agreeable with admission with pending blood cultures, ethanol, lactic acid.   Medical Decision  Making  Amount and/or Complexity of Data Reviewed  Labs: ordered.    Risk  Prescription drug management.    Critical Care  Total time providing critical care: minutes (Critical Care  Date: 06/06/2024  Performed by: Maurice Hodges MD   Authorized by: Maurice Hodges MD   Total critical care time (exclusive of procedural time) : 45 minutes  Critical care was necessary to treat or prevent imminent or life-threatening deterioration of the following conditions:  Symptomatic anemia climbing blood transfusion, hypoglycemia, metabolic acidosis, frequent reassessments  )               I called and discussed the case with:  GI/Hospital Medicine    Please see ED course for discussion of workup and dispo if not listed above.          Final diagnoses:  [K92.0] Hematemesis  [K92.2] Gastrointestinal hemorrhage, unspecified gastrointestinal hemorrhage type (Primary)  [E16.2] Hypoglycemia  [Z91.148] H/O medication noncompliance  [D64.9] Anemia, unspecified type        ED Disposition Condition    Observation Stable             ILubna, scribed for, and in the presence of, Maurice Hodges MD  . I performed the scribed service and the documentation accurately describes the services I performed. I attest to the accuracy of the note.     ED Course as of 06/06/24 0559   Thu Jun 06, 2024   0418 Patient declined digital rectal exam and Hemoccult testing [HM]   0424 EKG shows normal sinus rhythm with ventricular rate of 99 beats per minute.  Narrow QRS.  No ST segment elevations depressions no STEMI.   [HM]   0455 Hemoglobin(!): 7.0  Hemoglobin 7.6, 4 months ago [HM]      ED Course User Index  [HM] Maurice Hodges MD     Physician Attestation for Scribe: I, Maurice Hodges MD , reviewed documentation as scribed in my presence, which is both accurate and complete.             MD Carroll Rai Heyward B, MD  06/06/24 0559

## 2024-06-06 NOTE — SUBJECTIVE & OBJECTIVE
"Past Medical History:   Diagnosis Date    Alcoholism     Cirrhosis of liver     Cocaine abuse     Esophageal varices     Gout attack     Unspecified cirrhosis of liver     Upper GI bleed        Past Surgical History:   Procedure Laterality Date    ESOPHAGOGASTRODUODENOSCOPY         Review of patient's allergies indicates:  No Known Allergies    No current facility-administered medications on file prior to encounter.     Current Outpatient Medications on File Prior to Encounter   Medication Sig    gabapentin (NEURONTIN) 300 MG capsule Take 1 capsule (300 mg total) by mouth 3 (three) times daily.    oxyCODONE (ROXICODONE) 5 MG immediate release tablet Take 1 tablet (5 mg total) by mouth every 4 (four) hours as needed for Pain.    thiamine 100 MG tablet Take 1 tablet (100 mg total) by mouth once daily.     Family History    Family history is unknown by patient.       Tobacco Use    Smoking status: Never    Smokeless tobacco: Not on file   Substance and Sexual Activity    Alcohol use: Yes     Alcohol/week: 28.0 standard drinks of alcohol     Types: 28 Cans of beer per week     Comment: Drinks 1pint of vodka most days.  Last drink 6/4/24.    Drug use: Yes     Types: "Crack" cocaine     Comment: last cocaine use reported 6/5/24 - prior that several years    Sexual activity: Not Currently     Review of Systems   All other systems reviewed and are negative.    Objective:     Vital Signs (Most Recent):  Temp: 98.8 °F (37.1 °C) (06/06/24 0731)  Pulse: 77 (06/06/24 1103)  Resp: 20 (06/06/24 0731)  BP: 127/68 (06/06/24 1103)  SpO2: 100 % (06/06/24 1103) Vital Signs (24h Range):  Temp:  [98.4 °F (36.9 °C)-98.8 °F (37.1 °C)] 98.8 °F (37.1 °C)  Pulse:  [77-99] 77  Resp:  [16-20] 20  SpO2:  [97 %-100 %] 100 %  BP: (125-151)/(68-86) 127/68     Weight: 79.4 kg (175 lb)  Body mass index is 25.84 kg/m².     Physical Exam  Vitals and nursing note reviewed.   Constitutional:       General: He is not in acute distress.     Appearance: He " is well-developed. He is ill-appearing. He is not toxic-appearing.   HENT:      Head: Normocephalic and atraumatic.      Right Ear: External ear normal.      Left Ear: External ear normal.      Nose: Nose normal.      Mouth/Throat:      Mouth: Mucous membranes are moist.   Eyes:      Extraocular Movements: Extraocular movements intact.      Conjunctiva/sclera: Conjunctivae normal.   Cardiovascular:      Rate and Rhythm: Normal rate and regular rhythm.      Heart sounds: Normal heart sounds.   Pulmonary:      Effort: Pulmonary effort is normal. No respiratory distress.      Breath sounds: Normal breath sounds.   Abdominal:      General: Bowel sounds are normal. There is no distension.      Palpations: Abdomen is soft.      Tenderness: There is no abdominal tenderness.   Musculoskeletal:         General: Normal range of motion.      Cervical back: Normal range of motion. No rigidity.      Right lower leg: No edema.      Left lower leg: No edema.   Skin:     General: Skin is warm and dry.   Neurological:      Mental Status: He is alert and oriented to person, place, and time.      Cranial Nerves: No cranial nerve deficit.      Coordination: Coordination normal.   Psychiatric:         Mood and Affect: Mood normal.         Behavior: Behavior normal.                Significant Labs: All pertinent labs within the past 24 hours have been reviewed.    Significant Imaging: I have reviewed all pertinent imaging results/findings within the past 24 hours.

## 2024-06-06 NOTE — ASSESSMENT & PLAN NOTE
-Placed in observation  -Presented with one episode of coffee ground emesis and melena with Hb of 6.5.  -Noted history of alcohol related cirrhosis, non-bleeding varices, PUD and portal hypertensive gastropathy all in the context of continued heavy alcohol use and medication non-compliance  -GI consulted and input appreciated.  Discussed plan of care with Dr. Grant today  -Monitor cbc q8h.  Transfuse to keep Hb > 7.  -Treat with iv PPI q12 and octreotide drip  -Allow clear liquids today and NPO tonight for EGD tomorrow.

## 2024-06-06 NOTE — HPI
Mr. Grant is a 54-year-old man with alcoholic cirrhosis, gout, periodic cocaine use prior upper GI bleeding with varices and portal hypertensive gastropathy who presented for evaluation of hematemesis.  He reports he felt well until waking early this morning with acute onset nausea.  He states he typically feels nausea like this when he is worrying a lot, and he notes he recently received an eviction notice.  Shortly after waking, he had an episode of vomiting up a dark coffee ground substance.  This was was shortly followed by a small dark black bowel movement.  Due to this being similar to a prior episode of GI bleeding he presented to the hospital for evaluation.  He notes feeling light headed around the time of his vomiting this morning.  He denies fevers, chills, abdominal pain, chest pain, shortness of breath, headache, palpitations and swelling.  He notes he usually drinks about a pint of vodka daily and his last drink was 2 days prior to admit.  He denies alcoholism and notes he has never had alcohol withdrawals.  He admits he did use cocaine on the day prior to admission.

## 2024-06-06 NOTE — ASSESSMENT & PLAN NOTE
-Drinks 1 pint vodka daily with last drink on 6/4/24.  -Strongly does not believe he is an alcoholic and has never had withdrawal symptoms  -Counseled on cessation and risks of ongoing alcohol abuse  -Monitor CIWA Q4h  -Treat with gentle IV fluids, folic acid, thiamine and MVI

## 2024-06-07 ENCOUNTER — ANESTHESIA (OUTPATIENT)
Dept: ENDOSCOPY | Facility: OTHER | Age: 54
End: 2024-06-07
Payer: MEDICAID

## 2024-06-07 VITALS
OXYGEN SATURATION: 96 % | SYSTOLIC BLOOD PRESSURE: 139 MMHG | HEART RATE: 66 BPM | RESPIRATION RATE: 16 BRPM | HEIGHT: 69 IN | WEIGHT: 182.75 LBS | TEMPERATURE: 98 F | DIASTOLIC BLOOD PRESSURE: 83 MMHG | BODY MASS INDEX: 27.07 KG/M2

## 2024-06-07 LAB
ALBUMIN SERPL BCP-MCNC: 2.7 G/DL (ref 3.5–5.2)
ALP SERPL-CCNC: 98 U/L (ref 55–135)
ALT SERPL W/O P-5'-P-CCNC: 33 U/L (ref 10–44)
ANION GAP SERPL CALC-SCNC: 6 MMOL/L (ref 8–16)
AST SERPL-CCNC: 101 U/L (ref 10–40)
BASOPHILS # BLD AUTO: 0.04 K/UL (ref 0–0.2)
BASOPHILS NFR BLD: 0.5 % (ref 0–1.9)
BILIRUB SERPL-MCNC: 2.2 MG/DL (ref 0.1–1)
BUN SERPL-MCNC: 16 MG/DL (ref 6–20)
CALCIUM SERPL-MCNC: 8.4 MG/DL (ref 8.7–10.5)
CHLORIDE SERPL-SCNC: 102 MMOL/L (ref 95–110)
CO2 SERPL-SCNC: 23 MMOL/L (ref 23–29)
CREAT SERPL-MCNC: 1.1 MG/DL (ref 0.5–1.4)
DIFFERENTIAL METHOD BLD: ABNORMAL
EOSINOPHIL # BLD AUTO: 0.1 K/UL (ref 0–0.5)
EOSINOPHIL NFR BLD: 1.3 % (ref 0–8)
ERYTHROCYTE [DISTWIDTH] IN BLOOD BY AUTOMATED COUNT: 18.1 % (ref 11.5–14.5)
EST. GFR  (NO RACE VARIABLE): >60 ML/MIN/1.73 M^2
GLUCOSE SERPL-MCNC: 102 MG/DL (ref 70–110)
HCT VFR BLD AUTO: 24.5 % (ref 40–54)
HGB BLD-MCNC: 8 G/DL (ref 14–18)
IMM GRANULOCYTES # BLD AUTO: 0.04 K/UL (ref 0–0.04)
IMM GRANULOCYTES NFR BLD AUTO: 0.5 % (ref 0–0.5)
LYMPHOCYTES # BLD AUTO: 1.2 K/UL (ref 1–4.8)
LYMPHOCYTES NFR BLD: 15.6 % (ref 18–48)
MAGNESIUM SERPL-MCNC: 1.1 MG/DL (ref 1.6–2.6)
MCH RBC QN AUTO: 29.4 PG (ref 27–31)
MCHC RBC AUTO-ENTMCNC: 32.7 G/DL (ref 32–36)
MCV RBC AUTO: 90 FL (ref 82–98)
MONOCYTES # BLD AUTO: 0.5 K/UL (ref 0.3–1)
MONOCYTES NFR BLD: 6 % (ref 4–15)
NEUTROPHILS # BLD AUTO: 5.8 K/UL (ref 1.8–7.7)
NEUTROPHILS NFR BLD: 76.1 % (ref 38–73)
NRBC BLD-RTO: 0 /100 WBC
PLATELET # BLD AUTO: 135 K/UL (ref 150–450)
PMV BLD AUTO: 9 FL (ref 9.2–12.9)
POCT GLUCOSE: 105 MG/DL (ref 70–110)
POCT GLUCOSE: 177 MG/DL (ref 70–110)
POTASSIUM SERPL-SCNC: 3.9 MMOL/L (ref 3.5–5.1)
PROT SERPL-MCNC: 8.4 G/DL (ref 6–8.4)
RBC # BLD AUTO: 2.72 M/UL (ref 4.6–6.2)
SODIUM SERPL-SCNC: 131 MMOL/L (ref 136–145)
WBC # BLD AUTO: 7.67 K/UL (ref 3.9–12.7)

## 2024-06-07 PROCEDURE — 37000008 HC ANESTHESIA 1ST 15 MINUTES: Performed by: INTERNAL MEDICINE

## 2024-06-07 PROCEDURE — C9113 INJ PANTOPRAZOLE SODIUM, VIA: HCPCS | Performed by: HOSPITALIST

## 2024-06-07 PROCEDURE — 25000003 PHARM REV CODE 250: Performed by: NURSE ANESTHETIST, CERTIFIED REGISTERED

## 2024-06-07 PROCEDURE — 96366 THER/PROPH/DIAG IV INF ADDON: CPT

## 2024-06-07 PROCEDURE — 43235 EGD DIAGNOSTIC BRUSH WASH: CPT | Performed by: INTERNAL MEDICINE

## 2024-06-07 PROCEDURE — 37000009 HC ANESTHESIA EA ADD 15 MINS: Performed by: INTERNAL MEDICINE

## 2024-06-07 PROCEDURE — G0378 HOSPITAL OBSERVATION PER HR: HCPCS

## 2024-06-07 PROCEDURE — 63600175 PHARM REV CODE 636 W HCPCS: Performed by: NURSE ANESTHETIST, CERTIFIED REGISTERED

## 2024-06-07 PROCEDURE — 85025 COMPLETE CBC W/AUTO DIFF WBC: CPT | Performed by: HOSPITALIST

## 2024-06-07 PROCEDURE — 25000003 PHARM REV CODE 250: Performed by: HOSPITALIST

## 2024-06-07 PROCEDURE — 63600175 PHARM REV CODE 636 W HCPCS: Performed by: HOSPITALIST

## 2024-06-07 PROCEDURE — 94761 N-INVAS EAR/PLS OXIMETRY MLT: CPT

## 2024-06-07 PROCEDURE — 83735 ASSAY OF MAGNESIUM: CPT | Performed by: HOSPITALIST

## 2024-06-07 PROCEDURE — 63600175 PHARM REV CODE 636 W HCPCS: Mod: JA | Performed by: HOSPITALIST

## 2024-06-07 PROCEDURE — 80053 COMPREHEN METABOLIC PANEL: CPT | Performed by: HOSPITALIST

## 2024-06-07 PROCEDURE — 36430 TRANSFUSION BLD/BLD COMPNT: CPT

## 2024-06-07 PROCEDURE — D9220A PRA ANESTHESIA: Mod: CRNA,,, | Performed by: NURSE ANESTHETIST, CERTIFIED REGISTERED

## 2024-06-07 PROCEDURE — 96367 TX/PROPH/DG ADDL SEQ IV INF: CPT

## 2024-06-07 PROCEDURE — 36415 COLL VENOUS BLD VENIPUNCTURE: CPT | Performed by: HOSPITALIST

## 2024-06-07 PROCEDURE — 96376 TX/PRO/DX INJ SAME DRUG ADON: CPT

## 2024-06-07 PROCEDURE — D9220A PRA ANESTHESIA: Mod: ANES,,, | Performed by: ANESTHESIOLOGY

## 2024-06-07 RX ORDER — PANTOPRAZOLE SODIUM 40 MG/1
40 TABLET, DELAYED RELEASE ORAL DAILY
Qty: 30 TABLET | Refills: 1 | Status: SHIPPED | OUTPATIENT
Start: 2024-06-07 | End: 2025-06-07

## 2024-06-07 RX ORDER — MAGNESIUM SULFATE HEPTAHYDRATE 40 MG/ML
4 INJECTION, SOLUTION INTRAVENOUS ONCE
Status: COMPLETED | OUTPATIENT
Start: 2024-06-07 | End: 2024-06-07

## 2024-06-07 RX ORDER — HYDROMORPHONE HYDROCHLORIDE 2 MG/ML
0.4 INJECTION, SOLUTION INTRAMUSCULAR; INTRAVENOUS; SUBCUTANEOUS EVERY 5 MIN PRN
Status: DISCONTINUED | OUTPATIENT
Start: 2024-06-07 | End: 2024-06-07 | Stop reason: HOSPADM

## 2024-06-07 RX ORDER — SODIUM CHLORIDE 0.9 % (FLUSH) 0.9 %
3 SYRINGE (ML) INJECTION
Status: DISCONTINUED | OUTPATIENT
Start: 2024-06-07 | End: 2024-06-07 | Stop reason: HOSPADM

## 2024-06-07 RX ORDER — PROPOFOL 10 MG/ML
VIAL (ML) INTRAVENOUS
Status: DISCONTINUED | OUTPATIENT
Start: 2024-06-07 | End: 2024-06-07

## 2024-06-07 RX ORDER — MEPERIDINE HYDROCHLORIDE 25 MG/ML
12.5 INJECTION INTRAMUSCULAR; INTRAVENOUS; SUBCUTANEOUS ONCE AS NEEDED
Status: DISCONTINUED | OUTPATIENT
Start: 2024-06-07 | End: 2024-06-07 | Stop reason: HOSPADM

## 2024-06-07 RX ORDER — OXYCODONE HYDROCHLORIDE 5 MG/1
5 TABLET ORAL
Status: DISCONTINUED | OUTPATIENT
Start: 2024-06-07 | End: 2024-06-07 | Stop reason: HOSPADM

## 2024-06-07 RX ORDER — PROCHLORPERAZINE EDISYLATE 5 MG/ML
5 INJECTION INTRAMUSCULAR; INTRAVENOUS EVERY 30 MIN PRN
Status: DISCONTINUED | OUTPATIENT
Start: 2024-06-07 | End: 2024-06-07 | Stop reason: HOSPADM

## 2024-06-07 RX ORDER — LIDOCAINE HYDROCHLORIDE 20 MG/ML
INJECTION INTRAVENOUS
Status: DISCONTINUED | OUTPATIENT
Start: 2024-06-07 | End: 2024-06-07

## 2024-06-07 RX ADMIN — Medication 100 MG: at 09:06

## 2024-06-07 RX ADMIN — THERA TABS 1 TABLET: TAB at 09:06

## 2024-06-07 RX ADMIN — CEFTRIAXONE SODIUM 1 G: 1 INJECTION, POWDER, FOR SOLUTION INTRAMUSCULAR; INTRAVENOUS at 09:06

## 2024-06-07 RX ADMIN — OCTREOTIDE ACETATE 50 MCG/HR: 500 INJECTION, SOLUTION INTRAVENOUS; SUBCUTANEOUS at 09:06

## 2024-06-07 RX ADMIN — FOLIC ACID 1 MG: 1 TABLET ORAL at 09:06

## 2024-06-07 RX ADMIN — SODIUM CHLORIDE, SODIUM LACTATE, POTASSIUM CHLORIDE, AND CALCIUM CHLORIDE: 600; 310; 30; 20 INJECTION, SOLUTION INTRAVENOUS at 07:06

## 2024-06-07 RX ADMIN — PANTOPRAZOLE SODIUM 40 MG: 40 INJECTION, POWDER, LYOPHILIZED, FOR SOLUTION INTRAVENOUS at 09:06

## 2024-06-07 RX ADMIN — PROPOFOL 100 MG: 10 INJECTION, EMULSION INTRAVENOUS at 07:06

## 2024-06-07 RX ADMIN — LIDOCAINE HYDROCHLORIDE 100 MG: 20 INJECTION, SOLUTION INTRAVENOUS at 07:06

## 2024-06-07 RX ADMIN — PROPOFOL 50 MG: 10 INJECTION, EMULSION INTRAVENOUS at 07:06

## 2024-06-07 RX ADMIN — GLYCOPYRROLATE 0.2 MG: 0.2 INJECTION, SOLUTION INTRAMUSCULAR; INTRAVITREAL at 07:06

## 2024-06-07 RX ADMIN — MAGNESIUM SULFATE HEPTAHYDRATE 4 G: 40 INJECTION, SOLUTION INTRAVENOUS at 09:06

## 2024-06-07 NOTE — ASSESSMENT & PLAN NOTE
-Placed in observation  -Presented with one episode of coffee ground emesis and melena with Hb of 6.5.  -Noted history of alcohol related cirrhosis, non-bleeding varices, PUD and portal hypertensive gastropathy all in the context of continued heavy alcohol use and medication non-compliance  -GI consulted and input appreciated.    -Transfused 1 unit PRBC and Hb improved to 8.0.  He has had no further vomiting or evidence of bleeding.  -Taken for EGD this morning which showed small non-bleeding grade II esophageal varices, portal hypertensive gastropathy, normal examined duodenum.   -Discussed with Dr. Ellis and noted no evidence of bleeding seen on EGD so source remains unclear.  Doubt esophageal varices bleeding based on EGD and no ulcers with stigmata of recent bleeding.  -Patient is VERY strongly requesting discharge home.  Tiesha advance diet and if tolerates allow discharge home today.  Recommend continued daily PPI and complete cessation of alcohol consumption.  -Possible discharge home this afternoon.

## 2024-06-07 NOTE — TRANSFER OF CARE
"Anesthesia Transfer of Care Note    Patient: Joel Grant    Procedure(s) Performed: Procedure(s) (LRB):  EGD (ESOPHAGOGASTRODUODENOSCOPY) (N/A)    Patient location: PACU    Anesthesia Type: MAC    Transport from OR: Transported from OR on 6-10 L/min O2 by face mask with adequate spontaneous ventilation    Post pain: adequate analgesia    Post assessment: no apparent anesthetic complications and tolerated procedure well    Post vital signs: stable    Level of consciousness: awake    Nausea/Vomiting: no nausea/vomiting    Complications: none    Transfer of care protocol was followed      Last vitals: Visit Vitals  /81 (BP Location: Right arm, Patient Position: Lying)   Pulse 63   Temp 36.8 °C (98.3 °F) (Oral)   Resp 18   Ht 5' 9" (1.753 m)   Wt 82.9 kg (182 lb 12.2 oz)   SpO2 99%   BMI 26.99 kg/m²     "

## 2024-06-07 NOTE — ANESTHESIA POSTPROCEDURE EVALUATION
Anesthesia Post Evaluation    Patient: Joel Grant    Procedure(s) Performed: Procedure(s) (LRB):  EGD (ESOPHAGOGASTRODUODENOSCOPY) (N/A)    Final Anesthesia Type: MAC      Patient location during evaluation: PACU  Patient participation: Yes- Able to Participate  Level of consciousness: awake and alert  Post-procedure vital signs: reviewed and stable  Pain management: adequate  Airway patency: patent    PONV status at discharge: No PONV  Anesthetic complications: no      Cardiovascular status: blood pressure returned to baseline  Respiratory status: unassisted  Hydration status: euvolemic  Follow-up not needed.              Vitals Value Taken Time   /80 06/07/24 0929   Temp 37.1 °C (98.7 °F) 06/07/24 0929   Pulse 61 06/07/24 1051   Resp 16 06/07/24 0929   SpO2 99 % 06/07/24 0929         Event Time   Out of Recovery 06/07/2024 08:53:57         Pain/Chelsea Score: Pain Rating Prior to Med Admin: 7 (6/6/2024 10:40 PM)  Pain Rating Post Med Admin: 4 (6/6/2024 11:40 PM)  Chelsea Score: 9 (6/7/2024  8:30 AM)

## 2024-06-07 NOTE — ASSESSMENT & PLAN NOTE
-History noted.  -On admit INR 1.3, Na normal, mild anion-gap acidosis,  and ALT 46.  -Needs to stop drinking - does not believe he is an alcoholic.  -Beta-hydroxybutyrate normal.  EtOH not detectable  -Counseled on cessation.  -AST improving and ALT has normalized.

## 2024-06-07 NOTE — PROGRESS NOTES
[facility], [place], operated by [facility]  Progress Note    Patient Name: Joel Grant  MRN: [id number]  Patient Class: OP- Observation   Admission Date: 6/6/2024  Length of Stay: 0 days  Attending Physician: Jeffry Aj MD  Primary Care Provider: Argelia, Primary Doctor        Subjective:     Principal Problem:Upper GI bleeding        HPI:  Mr. Grant is a 54-year-old man with alcoholic cirrhosis, gout, periodic cocaine use prior upper GI bleeding with varices and portal hypertensive gastropathy who presented for evaluation of hematemesis.  He reports he felt well until waking early this morning with acute onset nausea.  He states he typically feels nausea like this when he is worrying a lot, and he notes he recently received an eviction notice.  Shortly after waking, he had an episode of vomiting up a dark coffee ground substance.  This was was shortly followed by a small dark black bowel movement.  Due to this being similar to a prior episode of GI bleeding he presented to the hospital for evaluation.  He notes feeling light headed around the time of his vomiting this morning.  He denies fevers, chills, abdominal pain, chest pain, shortness of breath, headache, palpitations and swelling.  He notes he usually drinks about a pint of vodka daily and his last drink was 2 days prior to admit.  He denies alcoholism and notes he has never had alcohol withdrawals.  He admits he did use cocaine on the day prior to admission.      Overview/Hospital Course:  No notes on file    Interval History: No acute events overnight.  Seen after EGD and states he is feeling well, hungry and strongly wishes to go home today.  All questions answered and patient had no further complaints.    Objective:     Vital Signs (Most Recent):  Temp: 98.7 °F (37.1 °C) (06/07/24 0929)  Pulse: 61 (06/07/24 1051)  Resp: 16 (06/07/24 0929)  BP: 136/80 (06/07/24 0929)  SpO2: 99 % (06/07/24 0929) Vital Signs (24h Range):  Temp:  [97.9 °F (36.6 °C)-99 °F  (37.2 °C)] 98.7 °F (37.1 °C)  Pulse:  [61-69] 61  Resp:  [16-18] 16  SpO2:  [95 %-100 %] 99 %  BP: (116-147)/(74-87) 136/80     Weight: 82.9 kg (182 lb 12.2 oz)  Body mass index is 26.99 kg/m².    Intake/Output Summary (Last 24 hours) at 6/7/2024 1202  Last data filed at 6/7/2024 1017  Gross per 24 hour   Intake 3935.4 ml   Output 2000 ml   Net 1935.4 ml         Physical Exam  Vitals and nursing note reviewed.   Constitutional:       General: He is not in acute distress.     Appearance: He is well-developed. He is not ill-appearing or toxic-appearing.   HENT:      Head: Normocephalic and atraumatic.      Right Ear: External ear normal.      Left Ear: External ear normal.      Nose: Nose normal.      Mouth/Throat:      Mouth: Mucous membranes are moist.   Eyes:      Extraocular Movements: Extraocular movements intact.      Conjunctiva/sclera: Conjunctivae normal.   Cardiovascular:      Rate and Rhythm: Normal rate and regular rhythm.      Heart sounds: Normal heart sounds.   Pulmonary:      Effort: Pulmonary effort is normal. No respiratory distress.      Breath sounds: Normal breath sounds.   Abdominal:      General: Bowel sounds are normal. There is no distension.      Palpations: Abdomen is soft.      Tenderness: There is no abdominal tenderness.   Musculoskeletal:         General: Normal range of motion.      Cervical back: Normal range of motion. No rigidity.      Right lower leg: No edema.      Left lower leg: No edema.   Skin:     General: Skin is warm and dry.   Neurological:      Mental Status: He is alert and oriented to person, place, and time.      Cranial Nerves: No cranial nerve deficit.      Coordination: Coordination normal.   Psychiatric:         Mood and Affect: Mood normal.         Behavior: Behavior normal.             Significant Labs: All pertinent labs within the past 24 hours have been reviewed.    Significant Imaging: I have reviewed all pertinent imaging results/findings within the past 24  hours.    Assessment/Plan:      * Upper GI bleeding  -Placed in observation  -Presented with one episode of coffee ground emesis and melena with Hb of 6.5.  -Noted history of alcohol related cirrhosis, non-bleeding varices, PUD and portal hypertensive gastropathy all in the context of continued heavy alcohol use and medication non-compliance  -GI consulted and input appreciated.    -Transfused 1 unit PRBC and Hb improved to 8.0.  He has had no further vomiting or evidence of bleeding.  -Taken for EGD this morning which showed small non-bleeding grade II esophageal varices, portal hypertensive gastropathy, normal examined duodenum.   -Discussed with Dr. Ellis and noted no evidence of bleeding seen on EGD so source remains unclear.  Doubt esophageal varices bleeding based on EGD and no ulcers with stigmata of recent bleeding.  -Patient is VERY strongly requesting discharge home.  Tiesha advance diet and if tolerates allow discharge home today.  Recommend continued daily PPI and complete cessation of alcohol consumption.  -Possible discharge home this afternoon.    Alcoholic cirrhosis  -History noted.  -On admit INR 1.3, Na normal, mild anion-gap acidosis,  and ALT 46.  -Needs to stop drinking - does not believe he is an alcoholic.  -Beta-hydroxybutyrate normal.  EtOH not detectable  -Counseled on cessation.  -AST improving and ALT has normalized.    Acute blood loss anemia  -Hb 6.5 on admit  -Likely due to both acute blood loss anemia and chronic anemia  -Is not iron, folate or b12 deficient  -Likely anemia of chronic disease with acute blood loss anemia from GI bleeding  -Transfused 1 unit and PRBC improved to 8.0    Cocaine abuse  -Admits to use on day prior to admit.  States last use before that was several years  -UDS positive for cocaine  -Counseled on risks to ongoing use and need to abstain from this.    Alcohol abuse  -Drinks 1 pint vodka daily with last drink on 6/4/24.  -Strongly does not believe he  is an alcoholic and has never had withdrawal symptoms  -Counseled on cessation and risks of ongoing alcohol abuse  -Monitor CIWA Q4h  -Treated with gentle IV fluids, folic acid, thiamine and MVI  -No signs of alcohol withdrawal.      VTE Risk Mitigation (From admission, onward)           Ordered     Place sequential compression device  Until discontinued         06/06/24 0733     IP VTE LOW RISK PATIENT  Once         06/06/24 0733                    Discharge Planning   SERAFIN:      Code Status: Full Code   Is the patient medically ready for discharge?:     Reason for patient still in hospital (select all that apply): Treatment and Pending disposition                     Jeffry Aj MD  Department of Hospital Medicine   [facility] ([place])

## 2024-06-07 NOTE — DISCHARGE INSTRUCTIONS
Take all medications as prescribed.  Eat a cirrhosis diet and do not drink any alcohol  Follow up with your physicians as scheduled - pcp within 1 week.  Thank you for trusting [facility] and Dr. Aj with your care.  We are honored that you entrusted us with your healthcare needs. Your satisfaction is very important to us and we hope you have been very pleased with your experience at [facility]. After your discharge you may receive a survey asking you to rate your hospital experience. We encourage you to take the time to complete the survey as your feedback allows us to identify areas for improvement as well as recognize our staff.   We hope that you have received the very best care possible during your hospitalization at [facility], as your satisfaction is our top priority.

## 2024-06-07 NOTE — ASSESSMENT & PLAN NOTE
-Hb 6.5 on admit  -Likely due to both acute blood loss anemia and chronic anemia  -Is not iron, folate or b12 deficient  -Likely anemia of chronic disease with acute blood loss anemia from GI bleeding  -Transfused 1 unit and PRBC improved to 8.0

## 2024-06-07 NOTE — ASSESSMENT & PLAN NOTE
-Drinks 1 pint vodka daily with last drink on 6/4/24.  -Strongly does not believe he is an alcoholic and has never had withdrawal symptoms  -Counseled on cessation and risks of ongoing alcohol abuse  -Monitor CIWA Q4h  -Treated with gentle IV fluids, folic acid, thiamine and MVI  -No signs of alcohol withdrawal.

## 2024-06-07 NOTE — [employer] PATIENT INSTRUCTIONS
Discharge Summary/Instructions after an Endoscopic Procedure  Patient Name: Joel Grant  Patient MRN: [id number]  Patient YOB: 1970  Friday, June 7, 2024  Elizabeth Ellis MD  RESTRICTIONS:  During your procedure today, you received medications for sedation.  These   medications may affect your judgment, balance and coordination.  Therefore,   for 24 hours, you have the following restrictions:   - DO NOT drive a car, operate machinery, make legal/financial decisions,   sign important papers or drink alcohol.    ACTIVITY:  Today: no heavy lifting, straining or running due to procedural   sedation/anesthesia.  The following day: return to full activity including work.  DIET:  Eat and drink normally unless instructed otherwise.     TREATMENT FOR COMMON SIDE EFFECTS:  - Mild abdominal pain, nausea, belching, bloating or excessive gas:  rest,   eat lightly and use a heating pad.  - Sore Throat: treat with throat lozenges and/or gargle with warm salt   water.  - Because air was used during the procedure, expelling large amounts of air   from your rectum or belching is normal.  - If a bowel prep was taken, you may not have a bowel movement for 1-3 days.    This is normal.  SYMPTOMS TO WATCH FOR AND REPORT TO YOUR PHYSICIAN:  1. Abdominal pain or bloating, other than gas cramps.  2. Chest pain.  3. Back pain.  4. Signs of infection such as: chills or fever occurring within 24 hours   after the procedure.  5. Rectal bleeding, which would show as bright red, maroon, or black stools.   (A tablespoon of blood from the rectum is not serious, especially if   hemorrhoids are present.)  6. Vomiting.  7. Weakness or dizziness.  GO DIRECTLY TO THE NEAREST EMERGENCY ROOM IF YOU HAVE ANY OF THE FOLLOWING:      Difficulty breathing              Chills and/or fever over 101 F   Persistent vomiting and/or vomiting blood   Severe abdominal pain   Severe chest pain   Black, tarry stools   Bleeding- more than one tablespoon   Any  other symptom or condition that you feel may need urgent attention  Your doctor recommends these additional instructions:  If any biopsies were taken, your doctors clinic will contact you in 1 to 2   weeks with any results.  - Return patient to hospital [facility] for ongoing care.   - Resume previous diet today.   - Continue present medications.   - The findings and recommendations were discussed with the patient.  For questions, problems or results please call your physician - Elizabeth Ellis MD at Work:  [phone].  [facility], EMERGENCY ROOM PHONE NUMBER: [phone], [facility]   [phone].  IF A COMPLICATION OR EMERGENCY SITUATION ARISES AND YOU ARE UNABLE TO REACH   YOUR PHYSICIAN - GO DIRECTLY TO THE EMERGENCY ROOM.  Elizabeth Ellis MD  6/7/2024 8:02:01 AM  This report has been verified and signed electronically.  Dear patient,  As a result of recent federal legislation (The Federal Cures Act), you may   receive lab or pathology results from your procedure in your [facility]   account before your physician is able to contact you. Your physician or   their representative will relay the results to you with their   recommendations at their soonest availability.  Thank you,  [employer]

## 2024-06-07 NOTE — SUBJECTIVE & OBJECTIVE
Interval History: No acute events overnight.  Seen after EGD and states he is feeling well, hungry and strongly wishes to go home today.  All questions answered and patient had no further complaints.    Objective:     Vital Signs (Most Recent):  Temp: 98.7 °F (37.1 °C) (06/07/24 0929)  Pulse: 61 (06/07/24 1051)  Resp: 16 (06/07/24 0929)  BP: 136/80 (06/07/24 0929)  SpO2: 99 % (06/07/24 0929) Vital Signs (24h Range):  Temp:  [97.9 °F (36.6 °C)-99 °F (37.2 °C)] 98.7 °F (37.1 °C)  Pulse:  [61-69] 61  Resp:  [16-18] 16  SpO2:  [95 %-100 %] 99 %  BP: (116-147)/(74-87) 136/80     Weight: 82.9 kg (182 lb 12.2 oz)  Body mass index is 26.99 kg/m².    Intake/Output Summary (Last 24 hours) at 6/7/2024 1202  Last data filed at 6/7/2024 1017  Gross per 24 hour   Intake 3935.4 ml   Output 2000 ml   Net 1935.4 ml         Physical Exam  Vitals and nursing note reviewed.   Constitutional:       General: He is not in acute distress.     Appearance: He is well-developed. He is not ill-appearing or toxic-appearing.   HENT:      Head: Normocephalic and atraumatic.      Right Ear: External ear normal.      Left Ear: External ear normal.      Nose: Nose normal.      Mouth/Throat:      Mouth: Mucous membranes are moist.   Eyes:      Extraocular Movements: Extraocular movements intact.      Conjunctiva/sclera: Conjunctivae normal.   Cardiovascular:      Rate and Rhythm: Normal rate and regular rhythm.      Heart sounds: Normal heart sounds.   Pulmonary:      Effort: Pulmonary effort is normal. No respiratory distress.      Breath sounds: Normal breath sounds.   Abdominal:      General: Bowel sounds are normal. There is no distension.      Palpations: Abdomen is soft.      Tenderness: There is no abdominal tenderness.   Musculoskeletal:         General: Normal range of motion.      Cervical back: Normal range of motion. No rigidity.      Right lower leg: No edema.      Left lower leg: No edema.   Skin:     General: Skin is warm and dry.    Neurological:      Mental Status: He is alert and oriented to person, place, and time.      Cranial Nerves: No cranial nerve deficit.      Coordination: Coordination normal.   Psychiatric:         Mood and Affect: Mood normal.         Behavior: Behavior normal.             Significant Labs: All pertinent labs within the past 24 hours have been reviewed.    Significant Imaging: I have reviewed all pertinent imaging results/findings within the past 24 hours.

## 2024-06-07 NOTE — PLAN OF CARE
Patient condition adequate for discharge. AVS printed and gone over with patient via virtual nurse. PIV removed x2, personal belongings packed up to be sent with patient. Patient refusing transportation out of hospital and to home, requesting to just walk to house. Case management aware. No questions at this time. Patient currently ambulating towards garage independently for discharge.      Problem: Adult Inpatient Plan of Care  Goal: Plan of Care Review  Outcome: Met  Goal: Patient-Specific Goal (Individualized)  Outcome: Met  Goal: Absence of Hospital-Acquired Illness or Injury  Outcome: Met  Goal: Optimal Comfort and Wellbeing  Outcome: Met  Goal: Readiness for Transition of Care  Outcome: Met     Problem: Gastrointestinal Bleeding  Goal: Optimal Coping with Acute Illness  Outcome: Met  Goal: Hemostasis  Outcome: Met

## 2024-06-07 NOTE — ANESTHESIA PREPROCEDURE EVALUATION
06/07/2024  Joel Grant is a 54 y.o., male.      Pre-op Assessment    I have reviewed the Patient Summary Reports.     I have reviewed the Nursing Notes. I have reviewed the NPO Status.   I have reviewed the Medications.     Review of Systems  Anesthesia Hx:  No problems with previous Anesthesia             Denies Family Hx of Anesthesia complications.    Denies Personal Hx of Anesthesia complications.                    Social:  Non-Smoker, Recreational Drugs Current crack cocaine  ETOH abuse      Hematology/Oncology:    Oncology Normal    -- Anemia:               Hematology Comments: UGI bleed                    EENT/Dental:  EENT/Dental Normal           Cardiovascular:  Cardiovascular Normal Exercise tolerance: good                                           Pulmonary:  Pulmonary Normal                       Renal/:  Renal/ Normal                 Hepatic/GI:      Liver Disease,         Liver Disease  , Cirrhosis  Esophageal Varices    Musculoskeletal:  Musculoskeletal Normal                Neurological:  Neurology Normal                                      Endocrine:  Endocrine Normal            Dermatological:  Skin Normal    Psych:  Psychiatric Normal                       Anesthesia Plan  Type of Anesthesia, risks & benefits discussed:    Anesthesia Type: MAC  Intra-op Monitoring Plan: Standard ASA Monitors  Post Op Pain Control Plan: multimodal analgesia  Induction:  IV  Airway Plan: Video and Direct  Informed Consent: Informed consent signed with the Patient and all parties understand the risks and agree with anesthesia plan.  All questions answered.   ASA Score: 3  Day of Surgery Review of History & Physical: H&P Update referred to the surgeon/provider.    Ready For Surgery From Anesthesia Perspective.     .

## 2024-06-07 NOTE — PLAN OF CARE
Problem: Adult Inpatient Plan of Care  Goal: Plan of Care Review  Outcome: Adequate for Care Transition  Goal: Patient-Specific Goal (Individualized)  Outcome: Adequate for Care Transition  Goal: Absence of Hospital-Acquired Illness or Injury  Outcome: Adequate for Care Transition  Goal: Optimal Comfort and Wellbeing  Outcome: Adequate for Care Transition  Goal: Readiness for Transition of Care  Outcome: Adequate for Care Transition     Problem: Gastrointestinal Bleeding  Goal: Optimal Coping with Acute Illness  Outcome: Adequate for Care Transition  Goal: Hemostasis  Outcome: Adequate for Care Transition

## 2024-06-07 NOTE — PLAN OF CARE
06/07/24 1544   Final Note   Assessment Type Final Discharge Note   Anticipated Discharge Disposition Home   Hospital Resources/Appts/Education Provided Provided patient/caregiver with written discharge plan information;Appointments scheduled and added to AVS   Post-Acute Status   Discharge Delays None known at this time     Pt states he lives independently at home.     Family to provide transportation home.    No DC needs from CM perspective.

## 2024-06-09 ENCOUNTER — HOSPITAL ENCOUNTER (EMERGENCY)
Facility: OTHER | Age: 54
Discharge: HOME | End: 2024-06-09
Attending: EMERGENCY MEDICINE
Payer: MEDICAID

## 2024-06-09 VITALS
OXYGEN SATURATION: 99 % | RESPIRATION RATE: 18 BRPM | SYSTOLIC BLOOD PRESSURE: 124 MMHG | WEIGHT: 177 LBS | TEMPERATURE: 98 F | DIASTOLIC BLOOD PRESSURE: 78 MMHG | HEART RATE: 72 BPM | BODY MASS INDEX: 26.22 KG/M2 | HEIGHT: 69 IN

## 2024-06-09 DIAGNOSIS — R78.81 POSITIVE BLOOD CULTURE: Primary | ICD-10-CM

## 2024-06-09 LAB
ACINETOBACTER CALCOACETICUS/BAUMANNII COMPLEX: NOT DETECTED
BACTEROIDES FRAGILIS: NOT DETECTED
CANDIDA ALBICANS: NOT DETECTED
CANDIDA AURIS: NOT DETECTED
CANDIDA GLABRATA: NOT DETECTED
CANDIDA KRUSEI: NOT DETECTED
CANDIDA PARAPSILOSIS: NOT DETECTED
CANDIDA TROPICALIS: NOT DETECTED
CRYPTOCOCCUS NEOFORMANS/GATTII: NOT DETECTED
CTX-M GENE (ESBL PRODUCER): NORMAL
ENTEROBACTER CLOACAE COMPLEX: NOT DETECTED
ENTEROBACTERALES: NOT DETECTED
ENTEROCOCCUS FAECALIS: NOT DETECTED
ENTEROCOCCUS FAECIUM: NOT DETECTED
ESCHERICHIA COLI: NOT DETECTED
HAEMOPHILUS INFLUENZAE: NOT DETECTED
IMP GENE (CARBAPENEM RESISTANT): NORMAL
KLEBSIELLA AEROGENES: NOT DETECTED
KLEBSIELLA OXYTOCA: NOT DETECTED
KLEBSIELLA PNEUMONIAE GROUP: NOT DETECTED
KPC RESISTANCE GENE (CARBAPENEM): NORMAL
LISTERIA MONOCYTOGENES: NOT DETECTED
MCR-1: NORMAL
MEC A/C AND MREJ (MRSA): NORMAL
MEC A/C: NORMAL
NDM GENE (CARBAPENEM RESISTANT): NORMAL
NEISSERIA MENINGITIDIS: NOT DETECTED
OXA-48-LIKE (CARBAPENEM RESISTANT): NORMAL
PROTEUS SPECIES: NOT DETECTED
PSEUDOMONAS AERUGINOSA: NOT DETECTED
SALMONELLA SP: NOT DETECTED
SERRATIA MARCESCENS: NOT DETECTED
STAPHYLOCOCCUS AUREUS: NOT DETECTED
STAPHYLOCOCCUS EPIDERMIDIS: NOT DETECTED
STAPHYLOCOCCUS LUGDUNESIS: NOT DETECTED
STAPHYLOCOCCUS SPECIES: NOT DETECTED
STENOTROPHOMONAS MALTOPHILIA: NOT DETECTED
STREPTOCOCCUS AGALACTIAE: NOT DETECTED
STREPTOCOCCUS PNEUMONIAE: NOT DETECTED
STREPTOCOCCUS PYOGENES: NOT DETECTED
STREPTOCOCCUS SPECIES: NOT DETECTED
VAN A/B (VRE GENE): NORMAL
VIM GENE (CARBAPENEM RESISTANT): NORMAL

## 2024-06-09 PROCEDURE — 63600175 PHARM REV CODE 636 W HCPCS: Performed by: EMERGENCY MEDICINE

## 2024-06-09 PROCEDURE — 25000003 PHARM REV CODE 250: Performed by: EMERGENCY MEDICINE

## 2024-06-09 PROCEDURE — 96365 THER/PROPH/DIAG IV INF INIT: CPT

## 2024-06-09 PROCEDURE — 99284 EMERGENCY DEPT VISIT MOD MDM: CPT | Mod: 25

## 2024-06-09 PROCEDURE — 87040 BLOOD CULTURE FOR BACTERIA: CPT | Performed by: EMERGENCY MEDICINE

## 2024-06-09 RX ORDER — CIPROFLOXACIN 500 MG/1
500 TABLET ORAL 2 TIMES DAILY
Qty: 20 TABLET | Refills: 0 | Status: SHIPPED | OUTPATIENT
Start: 2024-06-09 | End: 2024-06-19

## 2024-06-09 RX ADMIN — CEFTRIAXONE SODIUM 1 G: 1 INJECTION, POWDER, FOR SOLUTION INTRAMUSCULAR; INTRAVENOUS at 05:06

## 2024-06-09 NOTE — DISCHARGE INSTRUCTIONS
Please return to the hospital if you have any fevers, fatigue or worsening weakness.    Mr. Grant,    Thank you for letting me care for you today! It was nice meeting you, and I hope you feel better soon.   If you would like access to your chart and what was done today please utilize the [facility].   Please come back to [facility] for all of your future medical needs.    Our goal in the emergency department is to always give you outstanding care and exceptional service. You may receive a survey by mail or e-mail in the next week regarding your experience in our ED. We would greatly appreciate you completing and returning the survey. Your feedback provides us with a way to recognize our staff who give very good care and it helps us learn how to improve when your experience was below our aspiration of excellence.     Sincerely,    Amadeo Powell MD  Board Certified Emergency Physician

## 2024-06-09 NOTE — ED PROVIDER NOTES
"Encounter Date: 6/9/2024       History     Chief Complaint   Patient presents with    Abnormal Lab     54 y.o. male to ED referred by MD Greg for evaluation after a positive blood culture result. Patient denies all medical complaints at this time.     Pleasant 55 yo man with a history of substance use and GI bleed recently discharged for gi bleed requiring transfusion. He has presented for evaluation of a positive blood culture and was called about the same today. He notes that he feels generally well, denies fevers, chills, abdominal pain or other complaint. He says he is frustrated that he had to return to the hospital and is ready to leave as soon as possible.       Review of patient's allergies indicates:  No Known Allergies  Past Medical History:   Diagnosis Date    Alcoholism     Cirrhosis of liver     Cocaine abuse     Esophageal varices     Gout attack     Unspecified cirrhosis of liver     Upper GI bleed      Past Surgical History:   Procedure Laterality Date    ESOPHAGOGASTRODUODENOSCOPY      ESOPHAGOGASTRODUODENOSCOPY N/A 6/7/2024    Procedure: EGD (ESOPHAGOGASTRODUODENOSCOPY);  Surgeon: Elizabeth Ellis MD;  Location: [facility];  Service: Endoscopy;  Laterality: N/A;     Family History   Family history unknown: Yes     Social History     Tobacco Use    Smoking status: Never   Substance Use Topics    Alcohol use: Yes     Alcohol/week: 28.0 standard drinks of alcohol     Types: 28 Cans of beer per week     Comment: Drinks 1pint of vodka most days.  Last drink 6/4/24.    Drug use: Yes     Types: "Crack" cocaine     Comment: last cocaine use reported 6/5/24 - prior that several years     Review of Systems  Constitutional-no fever  HEENT-no congestion  Eyes-no redness  Respiratory-no shortness of breath  Cardio-no chest pain  GI-no abdominal pain  Endocrine-no cold intolerance  -no difficulty urinating  MSK-no myalgias  Skin-no rashes  Allergy-no environmental allergy  Neurologic-, no headache  Hematology-no " swollen nodes  Behavioral-no confusion   Physical Exam     Initial Vitals [06/09/24 1559]   BP Pulse Resp Temp SpO2   122/71 68 16 98.1 °F (36.7 °C) 98 %      MAP       --         Physical Exam  Constitutional: Well appearing, no distress.  Eyes: Conjunctivae normal.  ENT       Head: Normocephalic, atraumatic.       Nose: Normal external appearance        Mouth/Throat: no strigulous respirations   Hematological/Lymphatic/Immunilogical: no visible lymphadenopathy   Cardiovascular: Normal rate,   Respiratory: Normal respiratory effort.   Gastrointestinal: non distended   Musculoskeletal: Normal range of motion in all extremities. No obvious deformities or swelling.  Neurologic: Alert, oriented. Normal speech and language. No gross focal neurologic deficits are appreciated.  Skin: Skin is warm, dry. No rash noted.  Psychiatric: Mood and affect are normal.    ED Course   Procedures  Labs Reviewed   CULTURE, BLOOD   CULTURE, BLOOD          Imaging Results    None          Medications   cefTRIAXone (Rocephin) 1 g in dextrose 5 % in water (D5W) 100 mL IVPB (MB+) (0 g Intravenous Stopped 6/9/24 3276)     Medical Decision Making  Ddx- bacteremia, contaminant cx, lab error, sepsis    No symptoms but gram neg concerning for bacteremia.   Received 2g rocephin on day of admission.  No systemic signs of infection or overt source at this time.   Plan will be for cx repeat.  Discussed the possible need for observation though as he feels well would prefer to avoid hospitalization.  Given rapid pcr neg status unclear underlying possible pathogen.  Will give rocephin IV.  Will provide oral ABX with gram neg coverage but have encouraged to return in case of any worsening.     Problems Addressed:  Positive blood culture: undiagnosed new problem with uncertain prognosis    Amount and/or Complexity of Data Reviewed  External Data Reviewed: labs, radiology, ECG and notes.     Details: Previous hospitalization for gi bleed requiring  transfusion  Labs: ordered. Decision-making details documented in ED Course.    Risk  OTC drugs.  Prescription drug management.  Drug therapy requiring intensive monitoring for toxicity.  Decision regarding hospitalization.  Diagnosis or treatment significantly limited by social determinants of health.  Risk Details: Substance abuse complicates this patients care as a social determinant of health.                                       Clinical Impression:  Final diagnoses:  [R78.81] Positive blood culture (Primary)          ED Disposition Condition    Discharge Stable          ED Prescriptions       Medication Sig Dispense Start Date End Date Auth. Provider    ciprofloxacin HCl (CIPRO) 500 MG tablet Take 1 tablet (500 mg total) by mouth 2 (two) times daily. for 10 days 20 tablet 6/9/2024 6/19/2024 Amadeo Powell MD          Follow-up Information       Follow up With Specialties Details Why Contact Info    [facility] Dept Emergency Medicine Go to  As needed [address] [id number]  [phone]             Amadeo Powell MD  06/10/24 2214

## 2024-06-09 NOTE — DISCHARGE SUMMARY
[facility] Medicine  Discharge Summary      Patient Name: Joel Grant  MRN: [id number]  MILANA: [id number]  Patient Class: OP- Observation  Admission Date: 6/6/2024  Hospital Length of Stay: 0 days  Discharge Date and Time: 6/7/2024  4:00 PM  Attending Physician: Argelia att. providers found   Discharging Provider: Petty Aj MD  Primary Care Provider: Argelia, Primary Doctor    Primary Care Team: Networked reference to record PCT     HPI:   Mr. Grant is a 54-year-old man with alcoholic cirrhosis, gout, periodic cocaine use prior upper GI bleeding with varices and portal hypertensive gastropathy who presented for evaluation of hematemesis.  He reports he felt well until waking early this morning with acute onset nausea.  He states he typically feels nausea like this when he is worrying a lot, and he notes he recently received an eviction notice.  Shortly after waking, he had an episode of vomiting up a dark coffee ground substance.  This was was shortly followed by a small dark black bowel movement.  Due to this being similar to a prior episode of GI bleeding he presented to the hospital for evaluation.  He notes feeling light headed around the time of his vomiting this morning.  He denies fevers, chills, abdominal pain, chest pain, shortness of breath, headache, palpitations and swelling.  He notes he usually drinks about a pint of vodka daily and his last drink was 2 days prior to admit.  He denies alcoholism and notes he has never had alcohol withdrawals.  He admits he did use cocaine on the day prior to admission.      Procedure(s) (LRB):  EGD (ESOPHAGOGASTRODUODENOSCOPY) (N/A)      Goals of Care Treatment Preferences:  Code Status: Full Code      Consults:   Consults (From admission, onward)          Status Ordering Provider     Inpatient consult to Social Work  Once        Provider:  (Not yet assigned)    Completed PETTY AJ     Inpatient consult to Gastroenterology  Once         Provider:  Al Grant MD    Completed PAULINE BRIGGS          [facility] Course By Problem:   * Upper GI bleeding  -Placed in observation  -Presented with one episode of coffee ground emesis and melena with Hb of 6.5.  -Noted history of alcohol related cirrhosis, non-bleeding varices, PUD and portal hypertensive gastropathy all in the context of continued heavy alcohol use and medication non-compliance  -GI consulted and input appreciated.    -Transfused 1 unit PRBC and Hb improved to 8.0.  He has had no further vomiting or evidence of bleeding.  -Taken for EGD this morning which showed small non-bleeding grade II esophageal varices, portal hypertensive gastropathy, normal examined duodenum.   -Discussed with Dr. Ellis and noted no evidence of bleeding seen on EGD so source remains unclear.  Doubt esophageal varices bleeding based on EGD and no ulcers with stigmata of recent bleeding.  -Patient is VERY strongly requesting discharge home (if not discharged will leave AMA).  Advanced diet which was tolerated.  Recommend continued daily PPI and complete cessation of alcohol consumption.  -Recommend close follow up with pcp.    Gram Negative Bacteremia      -Blood cultures obtained in ED were negative at time of discharge.        -He was afebrile without leukocytosis during his stay.        -On 6/9 blood cultures returned with GNR growing in one bottle.       -Rapid PCR identification negative on all measures       -Called his listed number [phone] - disconnected.        -Called his brother Duane ([phone]) and he gave me new number for patient - [phone].        -Called this number and spoke with patient - he says he feels normal without any feelings of infection or illness.  I recommended he come to ED for repeat cultures and he says he will come today.    Alcoholic cirrhosis  -History noted.  -On admit INR 1.3, Na normal, mild anion-gap acidosis,  and ALT 46.  -Needs to stop drinking - does not  believe he is an alcoholic.  -Beta-hydroxybutyrate normal.  EtOH not detectable  -Counseled on cessation.  -AST improving and ALT has normalized.     Acute blood loss anemia  -Hb 6.5 on admit  -Likely due to both acute blood loss anemia and chronic anemia  -Is not iron, folate or b12 deficient  -Likely anemia of chronic disease with acute blood loss anemia from GI bleeding  -Transfused 1 unit and PRBC improved to 8.0     Cocaine abuse  -Admits to use on day prior to admit.  States last use before that was several years  -UDS positive for cocaine  -Counseled on risks to ongoing use and need to abstain from this.     Alcohol abuse  -Drinks 1 pint vodka daily with last drink on 6/4/24.  -Strongly does not believe he is an alcoholic and has never had withdrawal symptoms  -Counseled on cessation and risks of ongoing alcohol abuse  -Monitor CIWA Q4h  -Treated with gentle IV fluids, folic acid, thiamine and MVI  -No signs of alcohol withdrawal.       Final Active Diagnoses:    Diagnosis Date Noted POA    PRINCIPAL PROBLEM:  Upper GI bleeding [K92.2] 06/06/2024 Yes    Alcoholic cirrhosis [K70.30] 06/06/2024 Yes    Acute blood loss anemia [D62] 06/06/2024 Yes    Alcohol abuse [F10.10] 06/06/2024 Yes    Cocaine abuse [F14.10] 06/06/2024 Yes      Problems Resolved During this Admission:       Discharged Condition: stable    Disposition: Home or Self Care    Follow Up:    Patient Instructions:      Notify your health care provider if you experience any of the following:  increased confusion or weakness     Notify your health care provider if you experience any of the following:  persistent dizziness, light-headedness, or visual disturbances     Notify your health care provider if you experience any of the following:  worsening rash     Notify your health care provider if you experience any of the following:  severe persistent headache     Notify your health care provider if you experience any of the following:  difficulty  "breathing or increased cough     Notify your health care provider if you experience any of the following:  severe uncontrolled pain     Notify your health care provider if you experience any of the following:  persistent nausea and vomiting or diarrhea     Notify your health care provider if you experience any of the following:  temperature >100.4     Activity as tolerated       Significant Diagnostic Studies: Labs: BMP: No results for input(s): "GLU", "NA", "K", "CL", "CO2", "BUN", "CREATININE", "CALCIUM", "MG" in the last 48 hours., CMP No results for input(s): "NA", "K", "CL", "CO2", "GLU", "BUN", "CREATININE", "CALCIUM", "PROT", "ALBUMIN", "BILITOT", "ALKPHOS", "AST", "ALT", "ANIONGAP", "ESTGFRAFRICA", "EGFRNONAA" in the last 48 hours., CBC No results for input(s): "WBC", "HGB", "HCT", "PLT" in the last 48 hours., INR   Lab Results   Component Value Date    INR 1.3 (H) 06/06/2024    INR 1.2 01/30/2024    INR 1.3 11/25/2022   , Lipid Panel No results found for: "CHOL", "HDL", "LDLCALC", "TRIG", "CHOLHDL", Troponin No results for input(s): "TROPONINI" in the last 168 hours., and A1C:   Recent Labs   Lab 06/06/24  0745   HGBA1C 4.6       Pending Diagnostic Studies:       None           Medications:  Reconciled Home Medications:      Medication List        START taking these medications      pantoprazole 40 MG tablet  Commonly known as: PROTONIX  Take 1 tablet (40 mg total) by mouth once daily.            STOP taking these medications      gabapentin 300 MG capsule  Commonly known as: NEURONTIN     oxyCODONE 5 MG immediate release tablet  Commonly known as: ROXICODONE     thiamine 100 MG tablet              Indwelling Lines/Drains at time of discharge:   Lines/Drains/Airways       None                   Time spent on the discharge of patient: 35 minutes         Jeffry Aj MD  Department of Hospital Medicine  [facility] ([place])  "

## 2024-06-11 LAB — BACTERIA BLD CULT: NORMAL

## 2024-06-12 ENCOUNTER — HOSPITAL ENCOUNTER (EMERGENCY)
Facility: OTHER | Age: 54
Discharge: HOME | End: 2024-06-12
Attending: EMERGENCY MEDICINE
Payer: MEDICAID

## 2024-06-12 VITALS
DIASTOLIC BLOOD PRESSURE: 82 MMHG | OXYGEN SATURATION: 100 % | HEART RATE: 85 BPM | BODY MASS INDEX: 25.92 KG/M2 | HEIGHT: 69 IN | TEMPERATURE: 99 F | WEIGHT: 175 LBS | RESPIRATION RATE: 18 BRPM | SYSTOLIC BLOOD PRESSURE: 134 MMHG

## 2024-06-12 DIAGNOSIS — M79.645 PAIN OF FINGER OF LEFT HAND: ICD-10-CM

## 2024-06-12 DIAGNOSIS — R22.32 LOCALIZED SWELLING OF FINGER OF LEFT HAND: Primary | ICD-10-CM

## 2024-06-12 LAB
BACTERIA BLD CULT: ABNORMAL

## 2024-06-12 PROCEDURE — 99284 EMERGENCY DEPT VISIT MOD MDM: CPT

## 2024-06-12 PROCEDURE — 25000003 PHARM REV CODE 250

## 2024-06-12 RX ORDER — SULFAMETHOXAZOLE AND TRIMETHOPRIM 800; 160 MG/1; MG/1
1 TABLET ORAL 2 TIMES DAILY
Qty: 14 TABLET | Refills: 0 | Status: SHIPPED | OUTPATIENT
Start: 2024-06-12 | End: 2024-06-19

## 2024-06-12 RX ORDER — ACETAMINOPHEN 500 MG
1000 TABLET ORAL
Status: DISCONTINUED | OUTPATIENT
Start: 2024-06-12 | End: 2024-06-12

## 2024-06-12 RX ORDER — TRAMADOL HYDROCHLORIDE 50 MG/1
50 TABLET ORAL ONCE
Status: COMPLETED | OUTPATIENT
Start: 2024-06-12 | End: 2024-06-12

## 2024-06-12 RX ORDER — TRAMADOL HYDROCHLORIDE 50 MG/1
50 TABLET ORAL EVERY 6 HOURS PRN
Qty: 12 TABLET | Refills: 0 | Status: SHIPPED | OUTPATIENT
Start: 2024-06-12

## 2024-06-12 RX ORDER — SULFAMETHOXAZOLE AND TRIMETHOPRIM 800; 160 MG/1; MG/1
1 TABLET ORAL
Status: COMPLETED | OUTPATIENT
Start: 2024-06-12 | End: 2024-06-12

## 2024-06-12 RX ORDER — HYDROCODONE BITARTRATE AND ACETAMINOPHEN 5; 325 MG/1; MG/1
1 TABLET ORAL
Status: DISCONTINUED | OUTPATIENT
Start: 2024-06-12 | End: 2024-06-12

## 2024-06-12 RX ADMIN — TRAMADOL HYDROCHLORIDE 50 MG: 50 TABLET, COATED ORAL at 04:06

## 2024-06-12 RX ADMIN — SULFAMETHOXAZOLE AND TRIMETHOPRIM 1 TABLET: 800; 160 TABLET ORAL at 04:06

## 2024-06-12 NOTE — DISCHARGE INSTRUCTIONS
You likely have a localized infection of your finger which will hopefully resolve with antibiotics.  Start taking your antibiotics as prescribed until they are completely finished.      Return to the ER if your swelling and pain worsens, extends up your arm, or if you develop fever or chills.

## 2024-06-12 NOTE — FIRST PROVIDER EVALUATION
" Emergency Department TeleTriage Encounter Note      CHIEF COMPLAINT    Chief Complaint   Patient presents with    Hand Pain     Reports left index finger pain/swelling onset 6/6. Pt endorses recent d/c from hosp and states "my finger is swollen because y'all kept checking my sugar."       VITAL SIGNS   Initial Vitals [06/12/24 1541]   BP Pulse Resp Temp SpO2   132/78 87 18 -- 99 %      MAP       --            ALLERGIES    Review of patient's allergies indicates:  No Known Allergies    PROVIDER TRIAGE NOTE  This is a teletriage evaluation of a 54 y.o. male presenting to the ED complaining of finger pain. Patient reports left 2nd digit pain for 3 days. Pain is at the tip of the finger. He states pain is from constant blood sugar checks while admitted.    Patient is alert and oriented. He speaks in complete sentences. He is sitting upright in the chair in no distress.     Initial orders will be placed and care will be transferred to an alternate provider when patient is roomed for a full evaluation. Any additional orders and the final disposition will be determined by that provider.         ORDERS  Labs Reviewed - No data to display    ED Orders (720h ago, onward)      None              Virtual Visit Note: The provider triage portion of this emergency department evaluation and documentation was performed via [employer], a HIPAA-compliant telemedicine application, in concert with a tele-presenter in the room. A face to face patient evaluation with one of my colleagues will occur once the patient is placed in an emergency department room.      DISCLAIMER: This note was prepared with [employer] voice recognition transcription software. Garbled syntax, mangled pronouns, and other bizarre constructions may be attributed to that software system.    "

## 2024-06-12 NOTE — ED PROVIDER NOTES
"Encounter Date: 6/12/2024       History     Chief Complaint   Patient presents with    Hand Pain     Reports left index finger pain/swelling onset 6/6. Pt endorses recent d/c from hosp and states "my finger is swollen because y'all kept checking my sugar."     This is a 54-year-old male who presents to the ED with complaints of pain and swelling to his left index finger x6 days. Patient states that he notes that has been painful since he got discharged from the hospital on 06/06 and thinks it is because of multiple blood glucose checks to the same finger.  He has not taken anything for his symptoms.  States that the pain is worse when he tries to bend his finger.  Denies numbness or tingling, shortness of breath, fever or chills.    The history is provided by the patient.     Review of patient's allergies indicates:  No Known Allergies  Past Medical History:   Diagnosis Date    Alcoholism     Cirrhosis of liver     Cocaine abuse     Encounter for blood transfusion     Esophageal varices     Gout attack     Unspecified cirrhosis of liver     Upper GI bleed      Past Surgical History:   Procedure Laterality Date    ESOPHAGOGASTRODUODENOSCOPY      ESOPHAGOGASTRODUODENOSCOPY N/A 6/7/2024    Procedure: EGD (ESOPHAGOGASTRODUODENOSCOPY);  Surgeon: Elizabeth Ellis MD;  Location: [facility];  Service: Endoscopy;  Laterality: N/A;     Family History   Family history unknown: Yes     Social History     Tobacco Use    Smoking status: Never   Substance Use Topics    Alcohol use: Yes     Alcohol/week: 28.0 standard drinks of alcohol     Types: 28 Cans of beer per week     Comment: Drinks 1pint of vodka most days.  Last drink 6/4/24.    Drug use: Yes     Types: "Crack" cocaine     Comment: last cocaine use reported 6/5/24 - prior that several years     Review of Systems  10 point ROS performed and negative except as stated in HPI   Physical Exam     Initial Vitals   BP Pulse Resp Temp SpO2   06/12/24 1541 06/12/24 1541 06/12/24 1541 " 06/12/24 1659 06/12/24 1541   132/78 87 18 98.7 °F (37.1 °C) 99 %      MAP       --                Physical Exam    Constitutional: Vital signs are normal. He appears well-developed and well-nourished. He is cooperative.  Non-toxic appearance. He does not appear ill. No distress.   HENT:   Head: Normocephalic and atraumatic.   Eyes: Conjunctivae and lids are normal.   Neck: Trachea normal. Neck supple. No stridor present.   Cardiovascular:  Normal rate and regular rhythm.           Pulmonary/Chest: Effort normal. No tachypnea. No respiratory distress.   Abdominal: Abdomen is soft.   Musculoskeletal:      Cervical back: Neck supple.      Comments: Erythema and edema to the dorsal aspect of the left 2nd distal and middle phalanx with associated tenderness.  No fluctuance or obvious abscess.  Negative Kanavel signs.  FROM to PIP and DIP to that finger limited secondary to pain and swelling.  Sensation intact.     Neurological: He is alert and oriented to person, place, and time. GCS eye subscore is 4. GCS verbal subscore is 5. GCS motor subscore is 6.   Skin: Skin is warm, dry and intact. No rash noted.   Psychiatric: He has a normal mood and affect. His speech is normal and behavior is normal. Thought content normal.         ED Course   Procedures  Labs Reviewed - No data to display       Imaging Results    None          Medications   traMADoL tablet 50 mg (50 mg Oral Given 6/12/24 1651)   sulfamethoxazole-trimethoprim 800-160mg per tablet 1 tablet (1 tablet Oral Given 6/12/24 1652)     Medical Decision Making  Urgent evaluation of an afebrile 54-year-old male with pain and swelling to the middle and distal phalanx of the left 2nd finger.  There is erythema, edema, and associated tenderness to the area without any evidence of drainable abscess.  Negative Kanavel signs, lower suspicion for flexor tenosynovitis.  Patient is afebrile and hemodynamically stable.  Likely localized cellulitis to the area.  Will treat with  trial of antibiotics.  Strict return precautions given.  Counseled on need to follow up with PCP for further evaluation if symptoms persist.  Patient educated on signs and symptoms to monitor for and when to return to ED. Patient verbalized understanding agrees with treatment plan. All questions and concerns addressed.    Differential diagnosis includes but is not limited to:  Cellulitis, abscess, necrotizing fasciitis, osteomyelitis, septic joint, MRSA, DVT, drug eruption, allergic/contact dermatitis, EM/SJS, viral exanthem, local trauma/contusion    Risk  Prescription drug management.                                      Clinical Impression:  Final diagnoses:  [R22.32] Localized swelling of finger of left hand (Primary)  [M79.645] Pain of finger of left hand          ED Disposition Condition    Discharge Stable          ED Prescriptions       Medication Sig Dispense Start Date End Date Auth. Provider    sulfamethoxazole-trimethoprim 800-160mg (BACTRIM DS) 800-160 mg Tab Take 1 tablet by mouth 2 (two) times daily. for 7 days 14 tablet 6/12/2024 6/19/2024 Aide Segura PA-C    traMADoL (ULTRAM) 50 mg tablet Take 1 tablet (50 mg total) by mouth every 6 (six) hours as needed for Pain. 12 tablet 6/12/2024 -- Aide Segura PA-C          Follow-up Information       Follow up With Specialties Details Why Contact Info    [facility] Emergency Dept Emergency Medicine   [address] [id number] [phone]             Aide Segura PA-C  06/12/24 4118

## 2024-06-12 NOTE — ED TRIAGE NOTES
Pain and swelling to distal tip of left index finger since 6/9. States he was recently admitted to this hospital for GI bleed and believes pain is d/t multiple finger sticks to check CBG. Denies injury or trauma. No drainage noted. Presents in no distress.

## 2024-06-14 LAB
BACTERIA BLD CULT: NORMAL
BACTERIA BLD CULT: NORMAL

## 2024-09-09 PROBLEM — K92.2 UPPER GI BLEEDING: Status: RESOLVED | Noted: 2024-06-06 | Resolved: 2024-09-09

## 2024-09-28 ENCOUNTER — HOSPITAL ENCOUNTER (EMERGENCY)
Facility: OTHER | Age: 54
Discharge: HOME | End: 2024-09-28
Attending: EMERGENCY MEDICINE
Payer: MEDICAID

## 2024-09-28 VITALS
DIASTOLIC BLOOD PRESSURE: 58 MMHG | BODY MASS INDEX: 27.4 KG/M2 | HEART RATE: 74 BPM | HEIGHT: 69 IN | RESPIRATION RATE: 18 BRPM | SYSTOLIC BLOOD PRESSURE: 108 MMHG | WEIGHT: 185 LBS | TEMPERATURE: 98 F | OXYGEN SATURATION: 98 %
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
## 2024-09-28 DIAGNOSIS — S63.502A SPRAIN OF LEFT WRIST, INITIAL ENCOUNTER: ICD-10-CM

## 2024-09-28 DIAGNOSIS — M25.532 WRIST PAIN, ACUTE, LEFT: Primary | ICD-10-CM

## 2024-09-28 PROCEDURE — 25000003 PHARM REV CODE 250: Performed by: NURSE PRACTITIONER

## 2024-09-28 PROCEDURE — 99284 EMERGENCY DEPT VISIT MOD MDM: CPT | Mod: 25

## 2024-09-28 PROCEDURE — 29125 APPL SHORT ARM SPLINT STATIC: CPT

## 2024-09-28 RX ORDER — IBUPROFEN 800 MG/1
800 TABLET ORAL EVERY 6 HOURS PRN
Qty: 20 TABLET | Refills: 0 | Status: SHIPPED | OUTPATIENT
Start: 2024-09-28

## 2024-09-28 RX ORDER — DICLOFENAC SODIUM 10 MG/G
2 GEL TOPICAL 2 TIMES DAILY
Qty: 100 G | Refills: 0 | Status: SHIPPED | OUTPATIENT
Start: 2024-09-28 | End: 2024-10-03

## 2024-09-28 RX ORDER — IBUPROFEN 600 MG/1
600 TABLET ORAL
Status: COMPLETED | OUTPATIENT
Start: 2024-09-28 | End: 2024-09-28

## 2024-09-28 RX ADMIN — IBUPROFEN 600 MG: 600 TABLET, FILM COATED ORAL at 02:09

## 2024-09-28 NOTE — FIRST PROVIDER EVALUATION
Emergency Department TeleTriage Encounter Note      CHIEF COMPLAINT    Chief Complaint   Patient presents with    Wrist Pain     Tripped and fell 3-4 days ago and landed on left wrist. C/o wrist pain and swelling. Has has previous fracture in L wrist.       VITAL SIGNS   Initial Vitals [09/28/24 1328]   BP Pulse Resp Temp SpO2   (!) 108/58 74 18 98.3 °F (36.8 °C) 98 %      MAP       --            ALLERGIES    Review of patient's allergies indicates:  No Known Allergies    PROVIDER TRIAGE NOTE  This is a teletriage evaluation of a 54 y.o. male presenting to the ED complaining of left wrist pain for 3-4 days after trip and fall.     Alert, no distress.     Initial orders will be placed and care will be transferred to an alternate provider when patient is roomed for a full evaluation. Any additional orders and the final disposition will be determined by that provider.         ORDERS  Labs Reviewed - No data to display    ED Orders (720h ago, onward)      Start Ordered     Status Ordering Provider    09/28/24 1345 09/28/24 1336  ibuprofen tablet 600 mg  ED 1 Time         Ordered NAWAF MOTLEY    09/28/24 1337 09/28/24 1336  X-Ray Wrist Complete Left  1 time imaging         Ordered NAWAF MOTLEY              Virtual Visit Note: The provider triage portion of this emergency department evaluation and documentation was performed via [employer], a HIPAA-compliant telemedicine application, in concert with a tele-presenter in the room. A face to face patient evaluation with one of my colleagues will occur once the patient is placed in an emergency department room.      DISCLAIMER: This note was prepared with [employer] voice recognition transcription software. Garbled syntax, mangled pronouns, and other bizarre constructions may be attributed to that software system.

## 2024-09-28 NOTE — DISCHARGE INSTRUCTIONS
Alternate Tylenol 650 mg and ibuprofen 800 mg every 6 hours as needed for wrist pain. Rest, ice, keep your wrist in the brace and elevate. Follow up with your hand surgeon in clinic if pain persists or worsens. You would have to call and schedule an appt. Follow up with physical therapy. Referral has been provided.

## 2024-09-28 NOTE — ED PROVIDER NOTES
"Encounter Date: 9/28/2024       History     Chief Complaint   Patient presents with    Wrist Pain     Tripped and fell 3-4 days ago and landed on left wrist. C/o wrist pain and swelling. Has has previous fracture in L wrist.     Joel Grant is a 54 y.o. male, R hand dominant, presenting to the emergency department for evaluation of left wrist pain and swelling s/p trip and fall that occurred a few days ago. Patient states that he was helping his sister move when he tripped and fell forward. States that he broke his fall with his hands. Has been having pain, swelling and stiffness to the left wrist ever since the fall. No head trauma or LOC. He denies use of blood thinners. Reports history of left wrist fracture November 2023. Was evaluated by her hand surgery clinic and did not require surgical intervention. Has not taken any medications for his pain or swelling since the fall.       The history is provided by the patient.     Review of patient's allergies indicates:  No Known Allergies  Past Medical History:   Diagnosis Date    Alcoholism     Cirrhosis of liver     Cocaine abuse     Encounter for blood transfusion     Esophageal varices     Gout attack     Unspecified cirrhosis of liver     Upper GI bleed      Past Surgical History:   Procedure Laterality Date    ESOPHAGOGASTRODUODENOSCOPY      ESOPHAGOGASTRODUODENOSCOPY N/A 6/7/2024    Procedure: EGD (ESOPHAGOGASTRODUODENOSCOPY);  Surgeon: Elizabeth Ellis MD;  Location: [facility];  Service: Endoscopy;  Laterality: N/A;     Family History   Family history unknown: Yes     Social History     Tobacco Use    Smoking status: Never   Substance Use Topics    Alcohol use: Yes     Alcohol/week: 28.0 standard drinks of alcohol     Types: 28 Cans of beer per week     Comment: Drinks 1pint of vodka most days.  Last drink 6/4/24.    Drug use: Yes     Types: "Crack" cocaine     Comment: last cocaine use reported 6/5/24 - prior that several years     Review of Systems "   Constitutional:  Negative for chills and fever.   HENT:  Negative for congestion, rhinorrhea and sore throat.    Respiratory:  Negative for cough and shortness of breath.    Cardiovascular:  Negative for chest pain.   Gastrointestinal:  Negative for abdominal pain, diarrhea, nausea and vomiting.   Genitourinary:  Negative for dysuria, frequency and urgency.   Musculoskeletal:  Positive for arthralgias (left wrist pain). Negative for back pain.   Skin:  Negative for rash.   Neurological:  Negative for dizziness and headaches.   Psychiatric/Behavioral:  Negative for confusion.        Physical Exam     Initial Vitals [09/28/24 1328]   BP Pulse Resp Temp SpO2   (!) 108/58 74 18 98.3 °F (36.8 °C) 98 %      MAP       --         Physical Exam    Vitals reviewed.  Constitutional: He appears well-developed and well-nourished. No distress.   HENT:   Head: Normocephalic and atraumatic.   Eyes: Conjunctivae and EOM are normal.   Neck: Neck supple.   Normal range of motion.  Cardiovascular:  Normal rate, regular rhythm, normal heart sounds and intact distal pulses.           Pulses:       Radial pulses are 2+ on the left side.   Pulmonary/Chest: Breath sounds normal. No respiratory distress. He has no wheezes. He has no rhonchi. He has no rales. He exhibits no tenderness.   Musculoskeletal:         General: No edema.      Cervical back: Normal range of motion and neck supple.      Comments: Limited ROM of left wrist secondary to pain and swelling. No ttp of left radius or ulna. No crepitus, step-offs or deformities.      Neurological: He is alert and oriented to person, place, and time. He has normal strength.   Neurovascularly intact.   Skin: Skin is warm and dry. Capillary refill takes less than 2 seconds.   No overlying skin changes.    Psychiatric: He has a normal mood and affect. His behavior is normal. Judgment and thought content normal.         ED Course   Procedures  Labs Reviewed - No data to display       Imaging  Results              X-Ray Wrist Complete Left (Final result)  Result time 09/28/24 14:13:19      Final result by Caitlin Wooten MD (09/28/24 14:13:19)                   Impression:      Findings suggestive of near complete healing of the comminuted, intra-articular distal radial metaphyseal fracture.  No acute findings.      Electronically signed by: Caitlin Wooten MD  Date:    09/28/2024  Time:    14:13               Narrative:    EXAMINATION:  XR WRIST COMPLETE 3 VIEWS LEFT    CLINICAL HISTORY:  Unspecified fall, initial encounter    TECHNIQUE:  PA, lateral, and oblique views of the left wrist were performed.    COMPARISON:  11/15/2023 .    FINDINGS:  There is evidence of interval healing of the comminuted, intra-articular distal radial fracture. A fracture lucency is still identified at 2 sites on the articular surface.  There is also redemonstration of near complete loss of joint space at the radial carpal joint.  Mild edema through the soft tissues of the wrist. There is no acute fracture.  No dislocation or osseous destructive process.  No radiopaque foreign body.                                       Medications   ibuprofen tablet 600 mg (600 mg Oral Given 9/28/24 1444)     Medical Decision Making                        Medical Decision Making:   Initial Assessment:   Urgent evaluation of 54-year-old male, right-hand dominant, presenting with left wrist pain and swelling status post trip and fall that occurred a few days ago.  No head trauma or loss of consciousness.  Denies use of blood thinners.  Reports fracturing the left wrist in November 2023.  He did follow-up with a hand surgeon and did not require surgical repair.  On exam, he is well-appearing and nontoxic.  Hemodynamically stable.  Afebrile in the ED. Limited ROM of left wrist secondary to pain and swelling. No ttp of left radius or ulna. No crepitus, step-offs or deformities.  Neurovascularly intact.  X-ray of the left wrist ordered by  the tele triage provider is pending.  Will give ibuprofen.  Differential Diagnosis:   Differential diagnosis includes but not limited to wrist sprain, strain, hematoma, fracture, dislocation  Clinical Tests:   Radiological Study: Ordered and Reviewed  ED Management:  On review of x-ray, there is an old well healed fracture of the left wrist.  No acute fracture or dislocation today.  I updated the patient on all results.  Explained that he likely sprained his wrist from the fall.  He was placed in a velcro thumb spica splint.  Advised to follow-up with his hand surgeon if symptoms persist or worsen.  Discharged with prescription for ibuprofen and Voltaren gel.  Ambulatory referral to physical therapy was provided.  Patient verbalized understanding and agreement with this plan of care. He was given specific return precautions. Advised to follow up with PCP as needed. All questions and concerns addressed. He is stable for discharge.     This note was created with [employer]ict[employer]. Please excuse any spelling or grammatical errors.             Clinical Impression:  Final diagnoses:  [W19.XXXA] Fall  [M25.532] Wrist pain, acute, left (Primary)  [S63.502A] Sprain of left wrist, initial encounter          ED Disposition Condition    Discharge Stable          ED Prescriptions       Medication Sig Dispense Start Date End Date Auth. Provider    ibuprofen (ADVIL,MOTRIN) 800 MG tablet Take 1 tablet (800 mg total) by mouth every 6 (six) hours as needed for Pain. 20 tablet 9/28/2024 -- Aung Cheung PA-C    diclofenac sodium (VOLTAREN) 1 % Gel Apply 2 g topically 2 (two) times daily. for 5 days 100 g 9/28/2024 10/3/2024 Aung Cheung PA-C          Follow-up Information    None          Aung Cheung PA-C  09/29/24 4333